# Patient Record
Sex: MALE | Race: OTHER | NOT HISPANIC OR LATINO
[De-identification: names, ages, dates, MRNs, and addresses within clinical notes are randomized per-mention and may not be internally consistent; named-entity substitution may affect disease eponyms.]

---

## 2018-08-21 ENCOUNTER — APPOINTMENT (OUTPATIENT)
Dept: CARDIOLOGY | Facility: CLINIC | Age: 33
End: 2018-08-21
Payer: COMMERCIAL

## 2018-08-21 VITALS — SYSTOLIC BLOOD PRESSURE: 112 MMHG | DIASTOLIC BLOOD PRESSURE: 84 MMHG

## 2018-08-21 DIAGNOSIS — M54.9 DORSALGIA, UNSPECIFIED: ICD-10-CM

## 2018-08-21 DIAGNOSIS — F17.200 NICOTINE DEPENDENCE, UNSPECIFIED, UNCOMPLICATED: ICD-10-CM

## 2018-08-21 DIAGNOSIS — R07.9 CHEST PAIN, UNSPECIFIED: ICD-10-CM

## 2018-08-21 DIAGNOSIS — M25.511 PAIN IN RIGHT SHOULDER: ICD-10-CM

## 2018-08-21 DIAGNOSIS — Z82.5 FAMILY HISTORY OF ASTHMA AND OTHER CHRONIC LOWER RESPIRATORY DISEASES: ICD-10-CM

## 2018-08-21 PROCEDURE — 93000 ELECTROCARDIOGRAM COMPLETE: CPT

## 2018-08-21 PROCEDURE — 99244 OFF/OP CNSLTJ NEW/EST MOD 40: CPT

## 2018-08-25 ENCOUNTER — APPOINTMENT (OUTPATIENT)
Dept: CT IMAGING | Facility: IMAGING CENTER | Age: 33
End: 2018-08-25
Payer: COMMERCIAL

## 2018-08-25 ENCOUNTER — OUTPATIENT (OUTPATIENT)
Dept: OUTPATIENT SERVICES | Facility: HOSPITAL | Age: 33
LOS: 1 days | End: 2018-08-25
Payer: COMMERCIAL

## 2018-08-25 DIAGNOSIS — Z00.8 ENCOUNTER FOR OTHER GENERAL EXAMINATION: ICD-10-CM

## 2018-08-25 PROCEDURE — 76700 US EXAM ABDOM COMPLETE: CPT

## 2018-08-25 PROCEDURE — 76700 US EXAM ABDOM COMPLETE: CPT | Mod: 26

## 2018-10-25 ENCOUNTER — APPOINTMENT (OUTPATIENT)
Dept: CARDIOLOGY | Facility: CLINIC | Age: 33
End: 2018-10-25

## 2018-11-01 ENCOUNTER — APPOINTMENT (OUTPATIENT)
Dept: CARDIOLOGY | Facility: CLINIC | Age: 33
End: 2018-11-01
Payer: COMMERCIAL

## 2018-11-01 PROCEDURE — 93015 CV STRESS TEST SUPVJ I&R: CPT

## 2018-11-15 ENCOUNTER — LABORATORY RESULT (OUTPATIENT)
Age: 33
End: 2018-11-15

## 2018-11-15 ENCOUNTER — APPOINTMENT (OUTPATIENT)
Dept: INTERNAL MEDICINE | Facility: CLINIC | Age: 33
End: 2018-11-15

## 2018-11-15 ENCOUNTER — APPOINTMENT (OUTPATIENT)
Dept: INTERNAL MEDICINE | Facility: CLINIC | Age: 33
End: 2018-11-15
Payer: COMMERCIAL

## 2018-11-15 VITALS
BODY MASS INDEX: 21.98 KG/M2 | WEIGHT: 145 LBS | HEART RATE: 101 BPM | HEIGHT: 68 IN | DIASTOLIC BLOOD PRESSURE: 85 MMHG | SYSTOLIC BLOOD PRESSURE: 122 MMHG

## 2018-11-15 DIAGNOSIS — B27.90 INFECTIOUS MONONUCLEOSIS, UNSPECIFIED W/OUT COMPLICATION: ICD-10-CM

## 2018-11-15 DIAGNOSIS — Z86.69 PERSONAL HISTORY OF OTHER DISEASES OF THE NERVOUS SYSTEM AND SENSE ORGANS: ICD-10-CM

## 2018-11-15 PROCEDURE — 99213 OFFICE O/P EST LOW 20 MIN: CPT

## 2018-11-15 NOTE — HISTORY OF PRESENT ILLNESS
[Spouse] : spouse [FreeTextEntry1] : 1 month ago treated for 1 month ago with antibiotics steroids and cough medicinet ook 3 weeks to fully better. Now began coughing again, left ear hurts sore throat Green phlegm . Today fever chills and body aches

## 2018-11-15 NOTE — PHYSICAL EXAM
[No Acute Distress] : no acute distress [Well Nourished] : well nourished [Well Developed] : well developed [Well-Appearing] : well-appearing [Normal Sclera/Conjunctiva] : normal sclera/conjunctiva [PERRL] : pupils equal round and reactive to light [EOMI] : extraocular movements intact [Normal Oropharynx] : the oropharynx was normal [No JVD] : no jugular venous distention [Supple] : supple [No Lymphadenopathy] : no lymphadenopathy [Thyroid Normal, No Nodules] : the thyroid was normal and there were no nodules present [No Respiratory Distress] : no respiratory distress  [Clear to Auscultation] : lungs were clear to auscultation bilaterally [No Accessory Muscle Use] : no accessory muscle use [Normal Rate] : normal rate  [Regular Rhythm] : with a regular rhythm [Normal S1, S2] : normal S1 and S2 [No Murmur] : no murmur heard [No Carotid Bruits] : no carotid bruits [No Abdominal Bruit] : a ~M bruit was not heard ~T in the abdomen [No Varicosities] : no varicosities [Pedal Pulses Present] : the pedal pulses are present [No Edema] : there was no peripheral edema [No Extremity Clubbing/Cyanosis] : no extremity clubbing/cyanosis [No Palpable Aorta] : no palpable aorta [Soft] : abdomen soft [Non Tender] : non-tender [Non-distended] : non-distended [No Masses] : no abdominal mass palpated [No HSM] : no HSM [Normal Bowel Sounds] : normal bowel sounds [Normal Posterior Cervical Nodes] : no posterior cervical lymphadenopathy [Normal Anterior Cervical Nodes] : no anterior cervical lymphadenopathy [No CVA Tenderness] : no CVA  tenderness [No Spinal Tenderness] : no spinal tenderness [No Joint Swelling] : no joint swelling [Grossly Normal Strength/Tone] : grossly normal strength/tone [No Rash] : no rash [Normal Gait] : normal gait [Coordination Grossly Intact] : coordination grossly intact [No Focal Deficits] : no focal deficits [Deep Tendon Reflexes (DTR)] : deep tendon reflexes were 2+ and symmetric [Normal Affect] : the affect was normal [Normal Insight/Judgement] : insight and judgment were intact [de-identified] : severe redness left TM

## 2018-11-16 LAB
ALBUMIN SERPL ELPH-MCNC: 4.8 G/DL
ALP BLD-CCNC: 76 U/L
ALT SERPL-CCNC: 41 U/L
ANION GAP SERPL CALC-SCNC: 14 MMOL/L
AST SERPL-CCNC: 22 U/L
BASOPHILS # BLD AUTO: 0.08 K/UL
BASOPHILS NFR BLD AUTO: 0.9 %
BILIRUB SERPL-MCNC: 0.8 MG/DL
BUN SERPL-MCNC: 11 MG/DL
CALCIUM SERPL-MCNC: 9.3 MG/DL
CHLORIDE SERPL-SCNC: 101 MMOL/L
CHOLEST SERPL-MCNC: 123 MG/DL
CHOLEST/HDLC SERPL: 2.9 RATIO
CO2 SERPL-SCNC: 27 MMOL/L
CREAT SERPL-MCNC: 0.79 MG/DL
EOSINOPHIL # BLD AUTO: 0.23 K/UL
EOSINOPHIL NFR BLD AUTO: 2.6 %
GLUCOSE SERPL-MCNC: 83 MG/DL
HBA1C MFR BLD HPLC: 5.7 %
HCT VFR BLD CALC: 41.2 %
HDLC SERPL-MCNC: 42 MG/DL
HGB BLD-MCNC: 13.1 G/DL
LDLC SERPL CALC-MCNC: 64 MG/DL
LYMPHOCYTES # BLD AUTO: 3.82 K/UL
LYMPHOCYTES NFR BLD AUTO: 43.5 %
MAN DIFF?: NORMAL
MCHC RBC-ENTMCNC: 22.1 PG
MCHC RBC-ENTMCNC: 31.8 GM/DL
MCV RBC AUTO: 69.5 FL
MONOCYTES # BLD AUTO: 0.69 K/UL
MONOCYTES NFR BLD AUTO: 7.8 %
NEUTROPHILS # BLD AUTO: 3.82 K/UL
NEUTROPHILS NFR BLD AUTO: 43.5 %
PLATELET # BLD AUTO: 183 K/UL
POTASSIUM SERPL-SCNC: 4.1 MMOL/L
PROT SERPL-MCNC: 7.5 G/DL
RBC # BLD: 5.93 M/UL
RBC # FLD: 15.9 %
SODIUM SERPL-SCNC: 142 MMOL/L
T4 FREE SERPL-MCNC: 1.8 NG/DL
T4 SERPL-MCNC: 9.4 UG/DL
TRIGL SERPL-MCNC: 85 MG/DL
TSH SERPL-ACNC: 0.81 UIU/ML
WBC # FLD AUTO: 8.79 K/UL

## 2018-11-17 ENCOUNTER — MOBILE ON CALL (OUTPATIENT)
Age: 33
End: 2018-11-17

## 2018-11-18 LAB — HETEROPH AB SER QL: NEGATIVE

## 2018-11-20 ENCOUNTER — APPOINTMENT (OUTPATIENT)
Dept: OTOLARYNGOLOGY | Facility: CLINIC | Age: 33
End: 2018-11-20

## 2019-01-10 ENCOUNTER — APPOINTMENT (OUTPATIENT)
Dept: INTERNAL MEDICINE | Facility: CLINIC | Age: 34
End: 2019-01-10
Payer: COMMERCIAL

## 2019-01-10 PROCEDURE — 36415 COLL VENOUS BLD VENIPUNCTURE: CPT

## 2019-01-11 LAB
HBV SURFACE AB SER QL: REACTIVE
HBV SURFACE AG SER QL: NONREACTIVE
HIV1+2 AB SPEC QL IA.RAPID: NONREACTIVE

## 2019-02-15 ENCOUNTER — RECORD ABSTRACTING (OUTPATIENT)
Age: 34
End: 2019-02-15

## 2019-02-17 ENCOUNTER — APPOINTMENT (OUTPATIENT)
Dept: INTERNAL MEDICINE | Facility: CLINIC | Age: 34
End: 2019-02-17
Payer: COMMERCIAL

## 2019-02-17 VITALS — DIASTOLIC BLOOD PRESSURE: 88 MMHG | HEART RATE: 79 BPM | SYSTOLIC BLOOD PRESSURE: 128 MMHG

## 2019-02-17 PROCEDURE — 99395 PREV VISIT EST AGE 18-39: CPT | Mod: 25

## 2019-02-17 RX ORDER — PREDNISONE 20 MG/1
20 TABLET ORAL TWICE DAILY
Refills: 0 | Status: DISCONTINUED | COMMUNITY
End: 2019-02-17

## 2019-02-17 NOTE — HISTORY OF PRESENT ILLNESS
[FreeTextEntry1] : having pains in left lateral chest and lower right chest near sternum  had bad bronchitis in november and he felt it began then

## 2019-02-17 NOTE — HEALTH RISK ASSESSMENT
[Good] : ~his/her~  mood as  good [0] : 1) Little interest or pleasure doing things: Not at all (0) [] : No [ColonoscopyDate] : never done [HIVComments] : done ok

## 2019-02-17 NOTE — PHYSICAL EXAM

## 2019-02-17 NOTE — COUNSELING
[Weight management counseling provided] : Weight management [Healthy eating counseling provided] : healthy eating [Activity counseling provided] : activity [Smoking cessation counseling provided] : smoking cessation [de-identified] : patient counseled on    diet   importance of exercise and not smoking  regular dental care and periodic eye exams.  timing of when will be due for colonoscopy   discussed\par

## 2019-02-19 LAB
MEV IGG FLD QL IA: 35.1 AU/ML
MEV IGG+IGM SER-IMP: POSITIVE
MUV AB SER-ACNC: POSITIVE
MUV IGG SER QL IA: >300 AU/ML
RUBV IGG FLD-ACNC: 2.2 INDEX
RUBV IGG SER-IMP: POSITIVE
VZV AB TITR SER: POSITIVE
VZV IGG SER IF-ACNC: 797.8 INDEX

## 2019-02-27 ENCOUNTER — APPOINTMENT (OUTPATIENT)
Dept: ENDOCRINOLOGY | Facility: CLINIC | Age: 34
End: 2019-02-27
Payer: COMMERCIAL

## 2019-02-27 VITALS — WEIGHT: 160.13 LBS | BODY MASS INDEX: 24.35 KG/M2

## 2019-02-27 PROCEDURE — 97802 MEDICAL NUTRITION INDIV IN: CPT

## 2019-03-26 DIAGNOSIS — Z01.84 ENCOUNTER FOR ANTIBODY RESPONSE EXAMINATION: ICD-10-CM

## 2019-06-02 PROBLEM — Z01.84 IMMUNITY TO MEASLES DETERMINED BY SEROLOGIC TEST: Status: ACTIVE | Noted: 2019-06-02

## 2019-06-03 LAB
MEV IGG FLD QL IA: 31.3 AU/ML
MEV IGG+IGM SER-IMP: POSITIVE

## 2019-06-04 ENCOUNTER — TRANSCRIPTION ENCOUNTER (OUTPATIENT)
Age: 34
End: 2019-06-04

## 2019-08-20 ENCOUNTER — TRANSCRIPTION ENCOUNTER (OUTPATIENT)
Age: 34
End: 2019-08-20

## 2019-09-23 ENCOUNTER — TRANSCRIPTION ENCOUNTER (OUTPATIENT)
Age: 34
End: 2019-09-23

## 2019-09-29 ENCOUNTER — APPOINTMENT (OUTPATIENT)
Dept: INTERNAL MEDICINE | Facility: CLINIC | Age: 34
End: 2019-09-29
Payer: COMMERCIAL

## 2019-09-29 VITALS — SYSTOLIC BLOOD PRESSURE: 127 MMHG | DIASTOLIC BLOOD PRESSURE: 84 MMHG | HEART RATE: 79 BPM

## 2019-09-29 DIAGNOSIS — R09.82 POSTNASAL DRIP: ICD-10-CM

## 2019-09-29 PROCEDURE — 99213 OFFICE O/P EST LOW 20 MIN: CPT

## 2019-09-29 RX ORDER — CEFDINIR 300 MG/1
300 CAPSULE ORAL
Qty: 20 | Refills: 0 | Status: DISCONTINUED | COMMUNITY
Start: 2018-11-15 | End: 2019-09-29

## 2019-09-29 NOTE — PHYSICAL EXAM
[No Acute Distress] : no acute distress [Well Developed] : well developed [Well Nourished] : well nourished [Normal Outer Ear/Nose] : the outer ears and nose were normal in appearance [Normal Sclera/Conjunctiva] : normal sclera/conjunctiva [Normal Oropharynx] : the oropharynx was normal [No JVD] : no jugular venous distention [No Lymphadenopathy] : no lymphadenopathy [No Respiratory Distress] : no respiratory distress  [Supple] : supple [No Accessory Muscle Use] : no accessory muscle use [Normal Rate] : normal rate  [Clear to Auscultation] : lungs were clear to auscultation bilaterally [Regular Rhythm] : with a regular rhythm [Normal S1, S2] : normal S1 and S2

## 2019-09-29 NOTE — HISTORY OF PRESENT ILLNESS
[FreeTextEntry1] : pleuritic left chest pain  with cough at times and wheezing at night with some mucous  being coughed up  he is coughing a lot at night at   times he hears a wheeze claritin  has not done much the pain is so severe at times  at night he has has toget up  he has been feeling this way since 2 weeks ago

## 2019-10-18 ENCOUNTER — APPOINTMENT (OUTPATIENT)
Dept: PULMONOLOGY | Facility: CLINIC | Age: 34
End: 2019-10-18

## 2019-10-28 ENCOUNTER — APPOINTMENT (OUTPATIENT)
Dept: PULMONOLOGY | Facility: CLINIC | Age: 34
End: 2019-10-28
Payer: COMMERCIAL

## 2019-10-28 DIAGNOSIS — R05 COUGH: ICD-10-CM

## 2019-10-28 DIAGNOSIS — R07.81 PLEURODYNIA: ICD-10-CM

## 2019-10-28 PROCEDURE — 99203 OFFICE O/P NEW LOW 30 MIN: CPT | Mod: 25

## 2019-10-28 PROCEDURE — 94729 DIFFUSING CAPACITY: CPT

## 2019-10-28 PROCEDURE — 94727 GAS DIL/WSHOT DETER LNG VOL: CPT

## 2019-10-28 PROCEDURE — 94060 EVALUATION OF WHEEZING: CPT

## 2019-10-29 PROBLEM — R07.81 ANTERIOR PLEURITIC PAIN: Status: ACTIVE | Noted: 2019-09-29

## 2019-10-29 PROBLEM — R05 COUGH IN ADULT: Status: ACTIVE | Noted: 2018-11-15

## 2019-10-29 NOTE — HISTORY OF PRESENT ILLNESS
[FreeTextEntry1] : 33 year old man who is presenting due to left sided and lower sternal chest pain with breathing, ongoing cough with nasal congestion.  \par \par Symptoms developed about 2 months ago when he developed a URI and bronchitis.  He had dyspnea, cough, nasal congestion, brown mucus.  This improved but he still has episodic cough.  He still has spasmodic cough and some nasal congestion.   He notes tickle in throat.\par No wheeze.\par \par He has also had similar symptoms in past with change of season.\par \par He developed chest pain along with this that is worse with deep breath and lying on left side. Pain is worse at night.  It is located substernal and towards the left. \par \par SH\par \par former smoker since age 17 who stopped 3 years ago , 1 pack per month\par \par ETOH:  occasional\par \par \par ROS:\par no significant GERD\par \par \par PSH\par \par right shoulder injury\par \par PMH\par \par occasional back pain\par \par \par No history of asthma\par \par \par ALLERGIES\par \par nut, other foods\par \par some seasonal allergy, mild\par \par dust roaches, dust mite\par \par he has seen allergist\par MEDS:\par \par naproxen as needed\par \par Family History:\par \par There is no family history of cancer, chronic lung disease.

## 2019-10-29 NOTE — DISCUSSION/SUMMARY
[FreeTextEntry1] : Cough\par \par chest pain:  likely due to cough, possible costochondritis or intercostal muscle strain\par \par no obstruction on PFT\par \par PLAN\par \par CXR\par \par left rib series\par \par ibuprofen 2 tablets 1-2 times per day as needed, warm compress, cough suppressant prescribe Hycodan syrup 1-2 teaspoons at night\par \par saline nasal spray\par \par mucinex DM  at night\par \par [facility]

## 2019-10-29 NOTE — PHYSICAL EXAM
[Well Groomed] : well groomed [No Deformities] : no deformities [General Appearance - In No Acute Distress] : no acute distress [Normal Oropharynx] : normal oropharynx [Neck Appearance] : the appearance of the neck was normal [Jugular Venous Distention Increased] : there was no jugular-venous distention [Heart Rate And Rhythm] : heart rate and rhythm were normal [Heart Sounds] : normal S1 and S2 [Murmurs] : no murmurs present [Arterial Pulses Normal] : the arterial pulses were normal [Respiration, Rhythm And Depth] : normal respiratory rhythm and effort [Exaggerated Use Of Accessory Muscles For Inspiration] : no accessory muscle use [Auscultation Breath Sounds / Voice Sounds] : lungs were clear to auscultation bilaterally [Bowel Sounds] : normal bowel sounds [Abdomen Soft] : soft [Abdomen Tenderness] : non-tender [] : no hepato-splenomegaly [Abdomen Mass (___ Cm)] : no abdominal mass palpated [Abnormal Walk] : normal gait [Motor Exam] : the motor exam was normal [Affect] : the affect was normal [Mood] : the mood was normal [Low Lying Soft Palate] : no low lying soft palate [Elongated Uvula] : no elongated uvula [Enlarged Base of the Tongue] : no enlargement of the base of the tongue [Erythema] : no erythema of the pharynx [FreeTextEntry2] : no edema

## 2019-10-29 NOTE — REVIEW OF SYSTEMS
[Nasal Congestion] : nasal congestion [Cough] : cough [Dyspnea] : dyspnea [Pleuritic Pain] : pleuritic pain [Ear Disturbance] : no ear disturbance [Sinus Problems] : no sinus problems [Hypertension] : no ~T hypertension [Heartburn] : no heartburn [Reflux] : no reflux

## 2019-11-01 ENCOUNTER — FORM ENCOUNTER (OUTPATIENT)
Age: 34
End: 2019-11-01

## 2019-11-02 ENCOUNTER — APPOINTMENT (OUTPATIENT)
Dept: RADIOLOGY | Facility: IMAGING CENTER | Age: 34
End: 2019-11-02
Payer: COMMERCIAL

## 2019-11-02 ENCOUNTER — OUTPATIENT (OUTPATIENT)
Dept: OUTPATIENT SERVICES | Facility: HOSPITAL | Age: 34
LOS: 1 days | End: 2019-11-02
Payer: COMMERCIAL

## 2019-11-02 DIAGNOSIS — J45.909 UNSPECIFIED ASTHMA, UNCOMPLICATED: ICD-10-CM

## 2019-11-02 DIAGNOSIS — R05 COUGH: ICD-10-CM

## 2019-11-02 PROCEDURE — 71046 X-RAY EXAM CHEST 2 VIEWS: CPT

## 2019-11-02 PROCEDURE — 71110 X-RAY EXAM RIBS BIL 3 VIEWS: CPT | Mod: 26

## 2019-11-02 PROCEDURE — 71110 X-RAY EXAM RIBS BIL 3 VIEWS: CPT

## 2019-11-02 PROCEDURE — 71046 X-RAY EXAM CHEST 2 VIEWS: CPT | Mod: 26

## 2019-11-15 ENCOUNTER — APPOINTMENT (OUTPATIENT)
Dept: PULMONOLOGY | Facility: CLINIC | Age: 34
End: 2019-11-15

## 2019-12-06 ENCOUNTER — RX RENEWAL (OUTPATIENT)
Age: 34
End: 2019-12-06

## 2019-12-30 ENCOUNTER — TRANSCRIPTION ENCOUNTER (OUTPATIENT)
Age: 34
End: 2019-12-30

## 2020-02-17 ENCOUNTER — APPOINTMENT (OUTPATIENT)
Dept: ENDOCRINOLOGY | Facility: CLINIC | Age: 35
End: 2020-02-17
Payer: COMMERCIAL

## 2020-02-17 VITALS
SYSTOLIC BLOOD PRESSURE: 139 MMHG | TEMPERATURE: 98.5 F | RESPIRATION RATE: 16 BRPM | WEIGHT: 159 LBS | HEIGHT: 68 IN | HEART RATE: 84 BPM | DIASTOLIC BLOOD PRESSURE: 83 MMHG | OXYGEN SATURATION: 97 % | BODY MASS INDEX: 24.1 KG/M2

## 2020-02-17 LAB
GLUCOSE BLDC GLUCOMTR-MCNC: 94
HBA1C MFR BLD HPLC: 5.6

## 2020-02-17 PROCEDURE — 82962 GLUCOSE BLOOD TEST: CPT

## 2020-02-17 PROCEDURE — 83036 HEMOGLOBIN GLYCOSYLATED A1C: CPT | Mod: QW

## 2020-02-17 PROCEDURE — 99203 OFFICE O/P NEW LOW 30 MIN: CPT | Mod: 25

## 2020-02-17 NOTE — REVIEW OF SYSTEMS
[Fatigue] : no fatigue [Decreased Appetite] : appetite not decreased [Visual Field Defect] : no visual field defect [Blurry Vision] : no blurred vision [Dysphonia] : no dysphonia [Dysphagia] : no dysphagia [Chest Pain] : no chest pain [Palpitations] : no palpitations [Wheezing] : no wheezing was heard [Shortness Of Breath] : no shortness of breath [Cough] : no cough [Nausea] : no nausea [Constipation] : no constipation [Vomiting] : no vomiting was observed [Dysuria] : no dysuria [Diarrhea] : no diarrhea [Incontinence] : no incontinence [Joint Pain] : no joint pain [Joint Stiffness] : no joint stiffness [Muscle Weakness] : no muscle weakness [Hair Loss] : no hair loss [Muscle Cramps] : no muscle cramps [Dry Skin] : no dry skin [Headache] : no headaches [Tremors] : no tremors [Dizziness] : no dizziness [Cold Intolerance] : cold tolerant [Anxiety] : no anxiety [Depression] : no depression [Heat Intolerance] : heat tolerant [Swelling] : no swelling [Lymphadenopathy] : no lymphadenopathy

## 2020-02-17 NOTE — PHYSICAL EXAM
[Alert] : alert [No Acute Distress] : no acute distress [Normal Sclera/Conjunctiva] : normal sclera/conjunctiva [Well Nourished] : well nourished [Well Developed] : well developed [EOMI] : extra ocular movement intact [Normal Oropharynx] : the oropharynx was normal [No Proptosis] : no proptosis [Thyroid Not Enlarged] : the thyroid was not enlarged [No Thyroid Nodules] : there were no palpable thyroid nodules [No Respiratory Distress] : no respiratory distress [Clear to Auscultation] : lungs were clear to auscultation bilaterally [Normal Rate] : heart rate was normal  [No Accessory Muscle Use] : no accessory muscle use [Normal S1, S2] : normal S1 and S2 [Regular Rhythm] : with a regular rhythm [Pedal Pulses Normal] : the pedal pulses are present [Normal Bowel Sounds] : normal bowel sounds [No Edema] : there was no peripheral edema [Not Distended] : not distended [Soft] : abdomen soft [Not Tender] : non-tender [No Stigmata of Cushings Syndrome] : no stigmata of cushings syndrome [Spine Straight] : spine straight [No Spinal Tenderness] : no spinal tenderness [Normal Gait] : normal gait [Normal Strength/Tone] : muscle strength and tone were normal [Normal Reflexes] : deep tendon reflexes were 2+ and symmetric [No Rash] : no rash [Oriented x3] : oriented to person, place, and time [No Tremors] : no tremors [Acanthosis Nigricans] : no acanthosis nigricans

## 2020-02-17 NOTE — HISTORY OF PRESENT ILLNESS
[FreeTextEntry1] : 34 year old male with PMH of pre-DM (dx 2018) presented to establish care for pre-DM.\par \par Recent A1c 5.8% in 11/2019. \par POC A1c in office today : 5.6%\par \par Patient reports feeling well overall. Patient denies polyuria, polydipsia, shortness of breath, or lower extremity edema.He reports night sweats over the last several months. He reports profuse sweating overnight that soaks the sheets. \par \par Current DM regimen:\par no medications\par \par SMBG: not checking\par FS in office: fasting 94 \par \par Hypoglycemia none\par Has hypoglycemic awareness\par \par Diet: cut back on sweets, changed to brown rice, trying to keep keto diet and intermittent fasting, lean meats \par \par Weight loss: Has lost 5-6 lbs since 11/2019. \par \par Exercise: house work, walks daily \par \par Family history: maternal and paternal grandparents- DM

## 2020-02-17 NOTE — ASSESSMENT
[FreeTextEntry1] : 1. Pre-DM\par -A1c now within normal range to 5.6% following change in diet and exercise\par -targets for weight and A1c  have been discussed with pt \par -Exercise and healthy eating has been discussed with the pt and its importance in the management of pre- diabetes\par \par 2. Night sweats\par -patient to have TB and HIV ruled out by PMD \par -will obtain TFTs for further evaluation. Pheochromocytoma unlikely given symptoms are only overnight and BP well controlled

## 2020-02-19 LAB
ALBUMIN SERPL ELPH-MCNC: 4.8 G/DL
ALP BLD-CCNC: 66 U/L
ALT SERPL-CCNC: 75 U/L
ANION GAP SERPL CALC-SCNC: 12 MMOL/L
AST SERPL-CCNC: 36 U/L
BILIRUB SERPL-MCNC: 0.7 MG/DL
BUN SERPL-MCNC: 10 MG/DL
CALCIUM SERPL-MCNC: 9.7 MG/DL
CHLORIDE SERPL-SCNC: 102 MMOL/L
CO2 SERPL-SCNC: 27 MMOL/L
CREAT SERPL-MCNC: 1.09 MG/DL
GLUCOSE SERPL-MCNC: 102 MG/DL
POTASSIUM SERPL-SCNC: 4 MMOL/L
PROT SERPL-MCNC: 7.2 G/DL
SODIUM SERPL-SCNC: 141 MMOL/L
T4 FREE SERPL-MCNC: 1.4 NG/DL
TSH SERPL-ACNC: 1.73 UIU/ML

## 2020-03-09 ENCOUNTER — TRANSCRIPTION ENCOUNTER (OUTPATIENT)
Age: 35
End: 2020-03-09

## 2020-07-29 DIAGNOSIS — Z11.59 ENCOUNTER FOR SCREENING FOR OTHER VIRAL DISEASES: ICD-10-CM

## 2020-08-20 ENCOUNTER — APPOINTMENT (OUTPATIENT)
Dept: NUCLEAR MEDICINE | Facility: IMAGING CENTER | Age: 35
End: 2020-08-20
Payer: COMMERCIAL

## 2020-08-20 ENCOUNTER — OUTPATIENT (OUTPATIENT)
Dept: OUTPATIENT SERVICES | Facility: HOSPITAL | Age: 35
LOS: 1 days | End: 2020-08-20
Payer: COMMERCIAL

## 2020-08-20 DIAGNOSIS — Z00.8 ENCOUNTER FOR OTHER GENERAL EXAMINATION: ICD-10-CM

## 2020-08-20 PROCEDURE — 78315 BONE IMAGING 3 PHASE: CPT | Mod: 26

## 2020-08-20 PROCEDURE — 78830 RP LOCLZJ TUM SPECT W/CT 1: CPT

## 2020-08-20 PROCEDURE — 78315 BONE IMAGING 3 PHASE: CPT

## 2020-08-20 PROCEDURE — A9561: CPT

## 2020-08-20 PROCEDURE — 78830 RP LOCLZJ TUM SPECT W/CT 1: CPT | Mod: 26

## 2020-09-13 ENCOUNTER — NON-APPOINTMENT (OUTPATIENT)
Age: 35
End: 2020-09-13

## 2020-09-13 ENCOUNTER — APPOINTMENT (OUTPATIENT)
Dept: INTERNAL MEDICINE | Facility: CLINIC | Age: 35
End: 2020-09-13
Payer: COMMERCIAL

## 2020-09-13 VITALS — WEIGHT: 167 LBS | RESPIRATION RATE: 11 BRPM | BODY MASS INDEX: 25.31 KG/M2 | HEIGHT: 68 IN

## 2020-09-13 VITALS — SYSTOLIC BLOOD PRESSURE: 110 MMHG | DIASTOLIC BLOOD PRESSURE: 84 MMHG

## 2020-09-13 DIAGNOSIS — N50.819 TESTICULAR PAIN, UNSPECIFIED: ICD-10-CM

## 2020-09-13 DIAGNOSIS — Z23 ENCOUNTER FOR IMMUNIZATION: ICD-10-CM

## 2020-09-13 DIAGNOSIS — R10.9 UNSPECIFIED ABDOMINAL PAIN: ICD-10-CM

## 2020-09-13 LAB
BILIRUB UR QL STRIP: NEGATIVE
GLUCOSE UR-MCNC: NORMAL
HCG UR QL: 0.2 EU/DL
HGB UR QL STRIP.AUTO: NORMAL
KETONES UR-MCNC: NEGATIVE
LEUKOCYTE ESTERASE UR QL STRIP: NEGATIVE
NITRITE UR QL STRIP: NEGATIVE
PH UR STRIP: 5
PROT UR STRIP-MCNC: NEGATIVE
SP GR UR STRIP: >=1.03

## 2020-09-13 PROCEDURE — 90686 IIV4 VACC NO PRSV 0.5 ML IM: CPT

## 2020-09-13 PROCEDURE — G0444 DEPRESSION SCREEN ANNUAL: CPT | Mod: NC

## 2020-09-13 PROCEDURE — 81003 URINALYSIS AUTO W/O SCOPE: CPT | Mod: QW

## 2020-09-13 PROCEDURE — 99395 PREV VISIT EST AGE 18-39: CPT | Mod: 25

## 2020-09-13 PROCEDURE — 93000 ELECTROCARDIOGRAM COMPLETE: CPT

## 2020-09-13 PROCEDURE — 36415 COLL VENOUS BLD VENIPUNCTURE: CPT

## 2020-09-13 PROCEDURE — G0008: CPT

## 2020-09-13 RX ORDER — NAPROXEN SODIUM 220 MG
220 TABLET ORAL
Qty: 40 | Refills: 1 | Status: DISCONTINUED | COMMUNITY
Start: 2020-03-25 | End: 2020-09-13

## 2020-09-13 RX ORDER — HYDROCODONE BITARTRATE AND HOMATROPINE METHYLBROMIDE 5; 1.5 MG/5ML; MG/5ML
5-1.5 SYRUP ORAL
Qty: 240 | Refills: 0 | Status: DISCONTINUED | COMMUNITY
Start: 2018-11-15 | End: 2020-09-13

## 2020-09-13 RX ORDER — HYDROCODONE BITARTRATE AND HOMATROPINE METHYLBROMIDE 5; 1.5 MG/5ML; MG/5ML
5-1.5 SYRUP ORAL
Qty: 1 | Refills: 0 | Status: DISCONTINUED | COMMUNITY
Start: 2019-10-28 | End: 2020-09-13

## 2020-09-13 RX ORDER — AMOXICILLIN AND CLAVULANATE POTASSIUM 875; 125 MG/1; MG/1
875-125 TABLET, COATED ORAL
Qty: 20 | Refills: 0 | Status: DISCONTINUED | COMMUNITY
Start: 2019-09-29 | End: 2020-09-13

## 2020-09-13 NOTE — HISTORY OF PRESENT ILLNESS
[FreeTextEntry1] : has  a lot of gas  nad  ruq abd pain  at times worse after he eats   also says he  very tired he snores   stilll with night sweats

## 2020-09-13 NOTE — REVIEW OF SYSTEMS
[Negative] : Heme/Lymph [Night Sweats] : night sweats [FreeTextEntry8] : feels pain in testicles  when urinating [FreeTextEntry7] : gas   some heartburn

## 2020-09-13 NOTE — HEALTH RISK ASSESSMENT
[Good] : ~his/her~  mood as  good [] : Yes [Yes] : Yes [No Retinopathy] : No retinopathy [None] : None [Fully functional (bathing, dressing, toileting, transferring, walking, feeding)] : Fully functional (bathing, dressing, toileting, transferring, walking, feeding) [Fully functional (using the telephone, shopping, preparing meals, housekeeping, doing laundry, using] : Fully functional and needs no help or supervision to perform IADLs (using the telephone, shopping, preparing meals, housekeeping, doing laundry, using transportation, managing medications and managing finances) [0] : 1) Little interest or pleasure doing things: Not at all (0) [de-identified] : no exercise [de-identified] : poor [Change in mental status noted] : No change in mental status noted [Reports changes in hearing] : Reports no changes in hearing [Reports changes in vision] : Reports no changes in vision

## 2020-09-13 NOTE — PHYSICAL EXAM
[Normal] : normal gait, coordination grossly intact, no focal deficits [Normal Male:] : meatus normal, the bladder was normal on palpation, the scrotum was normal, there were no testicular masses and no prostate nodules.

## 2020-09-15 LAB
ALBUMIN SERPL ELPH-MCNC: 5.1 G/DL
ALP BLD-CCNC: 88 U/L
ALT SERPL-CCNC: 171 U/L
ANION GAP SERPL CALC-SCNC: 17 MMOL/L
AST SERPL-CCNC: 69 U/L
BASOPHILS # BLD AUTO: 0.07 K/UL
BASOPHILS NFR BLD AUTO: 0.9 %
BILIRUB SERPL-MCNC: 0.7 MG/DL
BUN SERPL-MCNC: 10 MG/DL
CALCIUM SERPL-MCNC: 10 MG/DL
CHLORIDE SERPL-SCNC: 101 MMOL/L
CHOLEST SERPL-MCNC: 146 MG/DL
CHOLEST/HDLC SERPL: 3.2 RATIO
CO2 SERPL-SCNC: 23 MMOL/L
CREAT SERPL-MCNC: 1.02 MG/DL
EOSINOPHIL # BLD AUTO: 0.19 K/UL
EOSINOPHIL NFR BLD AUTO: 2.4 %
ESTIMATED AVERAGE GLUCOSE: 123 MG/DL
GLUCOSE SERPL-MCNC: 87 MG/DL
HBA1C MFR BLD HPLC: 5.9 %
HCT VFR BLD CALC: 46.7 %
HDLC SERPL-MCNC: 46 MG/DL
HGB BLD-MCNC: 14 G/DL
IMM GRANULOCYTES NFR BLD AUTO: 0.3 %
LDLC SERPL CALC-MCNC: 79 MG/DL
LYMPHOCYTES # BLD AUTO: 3.35 K/UL
LYMPHOCYTES NFR BLD AUTO: 43.1 %
MAN DIFF?: NORMAL
MCHC RBC-ENTMCNC: 22 PG
MCHC RBC-ENTMCNC: 30 GM/DL
MCV RBC AUTO: 73.3 FL
MONOCYTES # BLD AUTO: 0.51 K/UL
MONOCYTES NFR BLD AUTO: 6.6 %
NEUTROPHILS # BLD AUTO: 3.64 K/UL
NEUTROPHILS NFR BLD AUTO: 46.7 %
PLATELET # BLD AUTO: 177 K/UL
POTASSIUM SERPL-SCNC: 4.4 MMOL/L
PROT SERPL-MCNC: 7.8 G/DL
RBC # BLD: 6.37 M/UL
RBC # FLD: 18.6 %
SARS-COV-2 IGG SERPL IA-ACNC: 0.08 INDEX
SARS-COV-2 IGG SERPL QL IA: NEGATIVE
SODIUM SERPL-SCNC: 141 MMOL/L
TRIGL SERPL-MCNC: 106 MG/DL
WBC # FLD AUTO: 7.78 K/UL

## 2020-09-24 ENCOUNTER — APPOINTMENT (OUTPATIENT)
Dept: ULTRASOUND IMAGING | Facility: CLINIC | Age: 35
End: 2020-09-24

## 2020-09-24 ENCOUNTER — APPOINTMENT (OUTPATIENT)
Dept: RADIOLOGY | Facility: CLINIC | Age: 35
End: 2020-09-24

## 2020-09-26 ENCOUNTER — APPOINTMENT (OUTPATIENT)
Dept: PULMONOLOGY | Facility: CLINIC | Age: 35
End: 2020-09-26

## 2020-10-10 ENCOUNTER — APPOINTMENT (OUTPATIENT)
Dept: PULMONOLOGY | Facility: CLINIC | Age: 35
End: 2020-10-10

## 2020-10-22 LAB
ALBUMIN SERPL ELPH-MCNC: 4.8 G/DL
ALP BLD-CCNC: 90 U/L
ALT SERPL-CCNC: 154 U/L
ANION GAP SERPL CALC-SCNC: 13 MMOL/L
AST SERPL-CCNC: 56 U/L
BILIRUB SERPL-MCNC: 0.6 MG/DL
BUN SERPL-MCNC: 14 MG/DL
CALCIUM SERPL-MCNC: 9.9 MG/DL
CHLORIDE SERPL-SCNC: 101 MMOL/L
CO2 SERPL-SCNC: 26 MMOL/L
CREAT SERPL-MCNC: 1.04 MG/DL
GGT SERPL-CCNC: 116 U/L
GLUCOSE SERPL-MCNC: 90 MG/DL
POTASSIUM SERPL-SCNC: 4.4 MMOL/L
PROT SERPL-MCNC: 7.4 G/DL
SODIUM SERPL-SCNC: 140 MMOL/L

## 2020-10-23 LAB — SARS-COV-2 N GENE NPH QL NAA+PROBE: NOT DETECTED

## 2020-11-17 ENCOUNTER — APPOINTMENT (OUTPATIENT)
Dept: UROLOGY | Facility: CLINIC | Age: 35
End: 2020-11-17
Payer: COMMERCIAL

## 2020-11-17 VITALS
DIASTOLIC BLOOD PRESSURE: 94 MMHG | HEIGHT: 68 IN | TEMPERATURE: 98.3 F | SYSTOLIC BLOOD PRESSURE: 144 MMHG | BODY MASS INDEX: 23.64 KG/M2 | HEART RATE: 74 BPM | RESPIRATION RATE: 16 BRPM | WEIGHT: 156 LBS

## 2020-11-17 PROCEDURE — 99203 OFFICE O/P NEW LOW 30 MIN: CPT

## 2020-11-19 NOTE — PHYSICAL EXAM
[General Appearance - Well Developed] : well developed [General Appearance - Well Nourished] : well nourished [Edema] : no peripheral edema [Exaggerated Use Of Accessory Muscles For Inspiration] : no accessory muscle use [Abdomen Soft] : soft [Abdomen Tenderness] : non-tender [Abdomen Mass (___ Cm)] : no abdominal mass palpated [Abdomen Hernia] : no hernia was discovered [Deferred] : exam was deferred [Prostate Tenderness] : was not tender [No Lesions] : no lesions [Circumcised] : the penis was circumcised [Normal] : normal [Scrotum Hydrocele On The Right] : no hydrocele [Scrotum Varicocele Right] : no varicocele [Scrotum Hydrocele On The Left] : no hydrocele [Scrotum Varicocele Left] : a varicocele [Testes] : normal [Epididymis] : was normal [Epididymis Spermatocele (___ Cm)] : had a spermatocele [Vas Deferens / Spermatic Cord] : was normal [Normal Station and Gait] : the gait and station were normal for the patient's age [] : no rash [No Focal Deficits] : no focal deficits [Oriented To Time, Place, And Person] : oriented to person, place, and time [Inguinal Lymph Nodes Enlarged Bilaterally] : inguinal

## 2020-11-19 NOTE — ASSESSMENT
[FreeTextEntry1] : On exam note left Vx and a cyst in epididymis - sent for ULS\par given infertility will refer to [facility] for W/U

## 2020-11-19 NOTE — HISTORY OF PRESENT ILLNESS
[FreeTextEntry1] : Patient here for evaluation of scrotal pain.\par he ahs noted over past 6 months intermittent left sided scrotal  pain - more of an ache localizing above and behind his testis. On exam he notes some larger veins. Sitting is better and more issue when standing.No swelling or tenderness. No h/o trauma, STDs or scrotall/pelvic surgery. \par \par during questioning also notes infertility. He . he and his wife had a stillborn but have been having unprotected sex f0r over a  1 year with no pregancies.

## 2020-12-05 ENCOUNTER — OUTPATIENT (OUTPATIENT)
Dept: OUTPATIENT SERVICES | Facility: HOSPITAL | Age: 35
LOS: 1 days | End: 2020-12-05
Payer: COMMERCIAL

## 2020-12-05 ENCOUNTER — APPOINTMENT (OUTPATIENT)
Dept: ULTRASOUND IMAGING | Facility: CLINIC | Age: 35
End: 2020-12-05
Payer: COMMERCIAL

## 2020-12-05 DIAGNOSIS — N50.82 SCROTAL PAIN: ICD-10-CM

## 2020-12-05 DIAGNOSIS — Z00.8 ENCOUNTER FOR OTHER GENERAL EXAMINATION: ICD-10-CM

## 2020-12-05 DIAGNOSIS — N46.9 MALE INFERTILITY, UNSPECIFIED: ICD-10-CM

## 2020-12-05 PROCEDURE — 93975 VASCULAR STUDY: CPT | Mod: 26

## 2020-12-05 PROCEDURE — 93975 VASCULAR STUDY: CPT

## 2020-12-16 PROBLEM — Z86.69 HISTORY OF OTITIS MEDIA: Status: RESOLVED | Noted: 2018-11-15 | Resolved: 2020-12-16

## 2021-01-21 ENCOUNTER — APPOINTMENT (OUTPATIENT)
Dept: UROLOGY | Facility: CLINIC | Age: 36
End: 2021-01-21
Payer: COMMERCIAL

## 2021-01-21 VITALS — TEMPERATURE: 98 F

## 2021-01-21 DIAGNOSIS — N46.9 MALE INFERTILITY, UNSPECIFIED: ICD-10-CM

## 2021-01-21 DIAGNOSIS — N43.40 SPERMATOCELE OF EPIDIDYMIS, UNSPECIFIED: ICD-10-CM

## 2021-01-21 DIAGNOSIS — N50.82 SCROTAL PAIN: ICD-10-CM

## 2021-01-21 PROCEDURE — 99072 ADDL SUPL MATRL&STAF TM PHE: CPT

## 2021-01-21 PROCEDURE — 99213 OFFICE O/P EST LOW 20 MIN: CPT

## 2021-01-21 NOTE — PHYSICAL EXAM
[Penis Abnormality] : normal circumcised penis [Testes Tenderness] : no tenderness of the testes [Skin Color & Pigmentation] : normal skin color and pigmentation [FreeTextEntry1] : left spermatocele; vas x 2 ; tight scrotum

## 2021-01-21 NOTE — ASSESSMENT
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 related to maternal diabetes.  They have been attemptnig to achieve a pregancy for  2 years\par He was told of a varicocele; however ultrasound demonstrates a spermatocele and describes a "tubular"  IMAGES REVIEWED.  The examination was not done upright nor did he peform a varicocele.\par He has a tight scrotum making examination difficult. \par His wife joined consultation after comleted by video.\par We discussed ultrasound and examonation\par We discussed :\par 1. endocrine evaluation\par 2. semen analysis\par 3. probable repeat USG and time of next visit.\par The R[facility] DANIELAROBBIE  expressed fully understanding of the information provided, the consequences and the management.\par

## 2021-01-21 NOTE — HISTORY OF PRESENT ILLNESS
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 related to maternal diabetes\par Duration of Relationship: 15 years; unprotected sexual intercourse x 2 years\par Years unprotected sexual intercourse:\par Time Bright:\par Sexual dysfunction: none\par Artificial lubricants: none\par Exposure history: occasional cigarette and etoh\par  [Erectile Dysfunction] : no Erectile Dysfunction

## 2021-02-24 ENCOUNTER — TRANSCRIPTION ENCOUNTER (OUTPATIENT)
Age: 36
End: 2021-02-24

## 2021-06-03 LAB — SARS-COV-2 N GENE NPH QL NAA+PROBE: NOT DETECTED

## 2021-06-11 ENCOUNTER — APPOINTMENT (OUTPATIENT)
Dept: INTERNAL MEDICINE | Facility: CLINIC | Age: 36
End: 2021-06-11
Payer: COMMERCIAL

## 2021-06-11 VITALS
TEMPERATURE: 98.9 F | HEART RATE: 82 BPM | WEIGHT: 174 LBS | SYSTOLIC BLOOD PRESSURE: 132 MMHG | OXYGEN SATURATION: 97 % | DIASTOLIC BLOOD PRESSURE: 84 MMHG | BODY MASS INDEX: 26.37 KG/M2 | HEIGHT: 68 IN

## 2021-06-11 DIAGNOSIS — J01.90 ACUTE SINUSITIS, UNSPECIFIED: ICD-10-CM

## 2021-06-11 PROCEDURE — 99072 ADDL SUPL MATRL&STAF TM PHE: CPT

## 2021-06-11 PROCEDURE — 99213 OFFICE O/P EST LOW 20 MIN: CPT

## 2021-06-11 NOTE — HISTORY OF PRESENT ILLNESS
[FreeTextEntry1] : he  has nasal congestion and chest tightness with chest pain when he coughs  thought he was a little hot 2 days ago  he is  decreasing  carbs last a1c 5.9

## 2021-06-11 NOTE — PHYSICAL EXAM
[Normal Outer Ear/Nose] : the outer ears and nose were normal in appearance [Normal Oropharynx] : the oropharynx was normal [Normal TMs] : both tympanic membranes were normal [No JVD] : no jugular venous distention [Supple] : supple [No Respiratory Distress] : no respiratory distress  [No Accessory Muscle Use] : no accessory muscle use [Clear to Auscultation] : lungs were clear to auscultation bilaterally

## 2021-06-14 ENCOUNTER — TRANSCRIPTION ENCOUNTER (OUTPATIENT)
Age: 36
End: 2021-06-14

## 2021-06-15 ENCOUNTER — TRANSCRIPTION ENCOUNTER (OUTPATIENT)
Age: 36
End: 2021-06-15

## 2021-07-25 ENCOUNTER — APPOINTMENT (OUTPATIENT)
Dept: INTERNAL MEDICINE | Facility: CLINIC | Age: 36
End: 2021-07-25
Payer: COMMERCIAL

## 2021-07-25 VITALS — DIASTOLIC BLOOD PRESSURE: 73 MMHG | HEART RATE: 75 BPM | SYSTOLIC BLOOD PRESSURE: 137 MMHG

## 2021-07-25 VITALS — BODY MASS INDEX: 26 KG/M2 | WEIGHT: 171 LBS

## 2021-07-25 VITALS — SYSTOLIC BLOOD PRESSURE: 110 MMHG | DIASTOLIC BLOOD PRESSURE: 70 MMHG

## 2021-07-25 DIAGNOSIS — N64.4 MASTODYNIA: ICD-10-CM

## 2021-07-25 DIAGNOSIS — R53.83 OTHER FATIGUE: ICD-10-CM

## 2021-07-25 DIAGNOSIS — R73.9 HYPERGLYCEMIA, UNSPECIFIED: ICD-10-CM

## 2021-07-25 DIAGNOSIS — R61 GENERALIZED HYPERHIDROSIS: ICD-10-CM

## 2021-07-25 PROCEDURE — 99214 OFFICE O/P EST MOD 30 MIN: CPT | Mod: 25

## 2021-07-25 PROCEDURE — 36415 COLL VENOUS BLD VENIPUNCTURE: CPT

## 2021-07-25 PROCEDURE — 99072 ADDL SUPL MATRL&STAF TM PHE: CPT

## 2021-07-25 RX ORDER — BENZONATATE 200 MG/1
200 CAPSULE ORAL EVERY 8 HOURS
Qty: 60 | Refills: 0 | Status: DISCONTINUED | COMMUNITY
Start: 2021-06-14 | End: 2021-07-25

## 2021-07-25 RX ORDER — BROMPHENIRAMINE MALEATE, PSEUDOEPHEDRINE HYDROCHLORIDE, 2; 30; 10 MG/5ML; MG/5ML; MG/5ML
30-2-10 SYRUP ORAL
Qty: 250 | Refills: 0 | Status: DISCONTINUED | COMMUNITY
Start: 2019-11-21 | End: 2021-07-25

## 2021-07-25 RX ORDER — NAPROXEN SODIUM 220 MG
220 TABLET ORAL
Qty: 40 | Refills: 0 | Status: DISCONTINUED | COMMUNITY
Start: 2019-09-29 | End: 2021-07-25

## 2021-07-25 RX ORDER — IPRATROPIUM BROMIDE 42 UG/1
0.06 SPRAY NASAL 3 TIMES DAILY
Qty: 45 | Refills: 0 | Status: DISCONTINUED | COMMUNITY
Start: 2019-09-29 | End: 2021-07-25

## 2021-07-25 RX ORDER — AMOXICILLIN AND CLAVULANATE POTASSIUM 875; 125 MG/1; MG/1
875-125 TABLET, COATED ORAL
Qty: 20 | Refills: 0 | Status: DISCONTINUED | COMMUNITY
Start: 2020-03-06 | End: 2021-07-25

## 2021-07-25 RX ORDER — BROMPHENIRAMINE MALEATE, PSEUDOEPHEDRINE HYDROCHLORIDE, 2; 30; 10 MG/5ML; MG/5ML; MG/5ML
30-2-10 SYRUP ORAL
Qty: 250 | Refills: 0 | Status: DISCONTINUED | COMMUNITY
Start: 2020-10-19 | End: 2021-07-25

## 2021-07-25 NOTE — HISTORY OF PRESENT ILLNESS
[FreeTextEntry1] : f/u  for predm  also says he is very tired  despite sleeping a lot  he does not do  exercise.  he never did the ordered lfts.  he still has  night sweats 3 times per week m also says he feels a lump in    in left breast   and feels pain in right breast but does not feel anything  he says  he is not   watching his carbs

## 2021-07-25 NOTE — PHYSICAL EXAM
[No Acute Distress] : no acute distress [Well Nourished] : well nourished [Well Developed] : well developed [Well-Appearing] : well-appearing [Normal Sclera/Conjunctiva] : normal sclera/conjunctiva [EOMI] : extraocular movements intact [Normal Outer Ear/Nose] : the outer ears and nose were normal in appearance [No JVD] : no jugular venous distention [No Lymphadenopathy] : no lymphadenopathy [Supple] : supple [Thyroid Normal, No Nodules] : the thyroid was normal and there were no nodules present [No Respiratory Distress] : no respiratory distress  [No Accessory Muscle Use] : no accessory muscle use [Clear to Auscultation] : lungs were clear to auscultation bilaterally [Normal Rate] : normal rate  [Regular Rhythm] : with a regular rhythm [Normal S1, S2] : normal S1 and S2 [No Murmur] : no murmur heard [No Carotid Bruits] : no carotid bruits [No Abdominal Bruit] : a ~M bruit was not heard ~T in the abdomen [No Varicosities] : no varicosities [Pedal Pulses Present] : the pedal pulses are present [No Edema] : there was no peripheral edema [No Palpable Aorta] : no palpable aorta [No Extremity Clubbing/Cyanosis] : no extremity clubbing/cyanosis [Soft] : abdomen soft [Non Tender] : non-tender [No Masses] : no abdominal mass palpated [Non-distended] : non-distended [No HSM] : no HSM [Normal Bowel Sounds] : normal bowel sounds [Normal Posterior Cervical Nodes] : no posterior cervical lymphadenopathy [Normal Anterior Cervical Nodes] : no anterior cervical lymphadenopathy [No CVA Tenderness] : no CVA  tenderness [No Joint Swelling] : no joint swelling [No Spinal Tenderness] : no spinal tenderness [Grossly Normal Strength/Tone] : grossly normal strength/tone [No Rash] : no rash [Coordination Grossly Intact] : coordination grossly intact [No Focal Deficits] : no focal deficits [Normal Gait] : normal gait [Deep Tendon Reflexes (DTR)] : deep tendon reflexes were 2+ and symmetric [Normal Affect] : the affect was normal [Normal Insight/Judgement] : insight and judgment were intact

## 2021-07-26 LAB
ALBUMIN SERPL ELPH-MCNC: 5 G/DL
ALP BLD-CCNC: 96 U/L
ALT SERPL-CCNC: 307 U/L
ANION GAP SERPL CALC-SCNC: 17 MMOL/L
AST SERPL-CCNC: 103 U/L
BASOPHILS # BLD AUTO: 0.04 K/UL
BASOPHILS NFR BLD AUTO: 0.5 %
BILIRUB SERPL-MCNC: 0.6 MG/DL
BUN SERPL-MCNC: 19 MG/DL
CALCIUM SERPL-MCNC: 10.4 MG/DL
CHLORIDE SERPL-SCNC: 102 MMOL/L
CO2 SERPL-SCNC: 20 MMOL/L
CREAT SERPL-MCNC: 1 MG/DL
EOSINOPHIL # BLD AUTO: 0.27 K/UL
EOSINOPHIL NFR BLD AUTO: 3.2 %
ERYTHROCYTE [SEDIMENTATION RATE] IN BLOOD BY WESTERGREN METHOD: 52 MM/HR
ESTIMATED AVERAGE GLUCOSE: 120 MG/DL
GGT SERPL-CCNC: 104 U/L
GLUCOSE SERPL-MCNC: 96 MG/DL
HBA1C MFR BLD HPLC: 5.8 %
HCT VFR BLD CALC: 44.5 %
HGB BLD-MCNC: 13.7 G/DL
IMM GRANULOCYTES NFR BLD AUTO: 0.2 %
LYMPHOCYTES # BLD AUTO: 4.07 K/UL
LYMPHOCYTES NFR BLD AUTO: 48.7 %
MAN DIFF?: NORMAL
MCHC RBC-ENTMCNC: 22 PG
MCHC RBC-ENTMCNC: 30.8 GM/DL
MCV RBC AUTO: 71.5 FL
MONOCYTES # BLD AUTO: 0.52 K/UL
MONOCYTES NFR BLD AUTO: 6.2 %
NEUTROPHILS # BLD AUTO: 3.43 K/UL
NEUTROPHILS NFR BLD AUTO: 41.2 %
PLATELET # BLD AUTO: 183 K/UL
POTASSIUM SERPL-SCNC: 4.4 MMOL/L
PROLACTIN SERPL-MCNC: 8.8 NG/ML
PROT SERPL-MCNC: 7.9 G/DL
RBC # BLD: 6.22 M/UL
RBC # FLD: 17.8 %
SODIUM SERPL-SCNC: 139 MMOL/L
THYROGLOB AB SERPL-ACNC: <20 IU/ML
THYROPEROXIDASE AB SERPL IA-ACNC: 43.2 IU/ML
TSH SERPL-ACNC: 1.66 UIU/ML
WBC # FLD AUTO: 8.35 K/UL

## 2021-07-27 LAB — ANACR T: NEGATIVE

## 2021-07-28 ENCOUNTER — APPOINTMENT (OUTPATIENT)
Dept: HEPATOLOGY | Facility: CLINIC | Age: 36
End: 2021-07-28
Payer: COMMERCIAL

## 2021-07-28 VITALS
BODY MASS INDEX: 25.76 KG/M2 | HEART RATE: 86 BPM | WEIGHT: 170 LBS | SYSTOLIC BLOOD PRESSURE: 125 MMHG | DIASTOLIC BLOOD PRESSURE: 80 MMHG | RESPIRATION RATE: 16 BRPM | TEMPERATURE: 97.3 F | HEIGHT: 68 IN | OXYGEN SATURATION: 98 %

## 2021-07-28 LAB — TOTAL T3-ESOTERIX: 139 NG/DL

## 2021-07-28 PROCEDURE — 99204 OFFICE O/P NEW MOD 45 MIN: CPT

## 2021-07-30 LAB
A1AT SERPL-MCNC: 141 MG/DL
ALBUMIN SERPL ELPH-MCNC: 4.9 G/DL
ALP BLD-CCNC: 88 U/L
ALT SERPL-CCNC: 330 U/L
ANA SER IF-ACNC: NEGATIVE
ANION GAP SERPL CALC-SCNC: 14 MMOL/L
AST SERPL-CCNC: 109 U/L
BASOPHILS # BLD AUTO: 0.04 K/UL
BASOPHILS NFR BLD AUTO: 0.5 %
BILIRUB DIRECT SERPL-MCNC: 0.2 MG/DL
BILIRUB INDIRECT SERPL-MCNC: 0.5 MG/DL
BILIRUB SERPL-MCNC: 0.7 MG/DL
BUN SERPL-MCNC: 12 MG/DL
CALCIUM SERPL-MCNC: 10.1 MG/DL
CERULOPLASMIN SERPL-MCNC: 29 MG/DL
CHLORIDE SERPL-SCNC: 102 MMOL/L
CMV IGG SERPL QL: 4.2 U/ML
CMV IGG SERPL-IMP: POSITIVE
CMV IGM SERPL QL: <8 AU/ML
CMV IGM SERPL QL: NEGATIVE
CO2 SERPL-SCNC: 23 MMOL/L
COPPER SERPL-MCNC: 117 UG/DL
CREAT SERPL-MCNC: 0.91 MG/DL
DEPRECATED KAPPA LC FREE/LAMBDA SER: 0.98 RATIO
EBV EA AB SER IA-ACNC: <5 U/ML
EBV EA AB TITR SER IF: POSITIVE
EBV EA IGG SER QL IA: >600 U/ML
EBV EA IGG SER-ACNC: NEGATIVE
EBV EA IGM SER IA-ACNC: NEGATIVE
EBV PATRN SPEC IB-IMP: NORMAL
EBV VCA IGG SER IA-ACNC: >750 U/ML
EBV VCA IGM SER QL IA: <10 U/ML
EOSINOPHIL # BLD AUTO: 0.19 K/UL
EOSINOPHIL NFR BLD AUTO: 2.3 %
EPSTEIN-BARR VIRUS CAPSID ANTIGEN IGG: POSITIVE
FERRITIN SERPL-MCNC: 525 NG/ML
GLUCOSE SERPL-MCNC: 121 MG/DL
HAV IGM SER QL: NONREACTIVE
HBV CORE IGG+IGM SER QL: NONREACTIVE
HBV SURFACE AB SER QL: REACTIVE
HBV SURFACE AG SER QL: NONREACTIVE
HCT VFR BLD CALC: 44.5 %
HCV AB SER QL: NONREACTIVE
HCV S/CO RATIO: 0.09 S/CO
HEPATITIS A IGG ANTIBODY: NONREACTIVE
HGB BLD-MCNC: 13.9 G/DL
HIV1+2 AB SPEC QL IA.RAPID: NONREACTIVE
IGA SER QL IEP: 222 MG/DL
IGG SER QL IEP: 1235 MG/DL
IGG4 SER-MCNC: 284 MG/DL
IGM SER QL IEP: 48 MG/DL
IMM GRANULOCYTES NFR BLD AUTO: 0.4 %
INR PPP: 1.06 RATIO
IRON SATN MFR SERPL: 25 %
IRON SERPL-MCNC: 110 UG/DL
KAPPA LC CSF-MCNC: 1.63 MG/DL
KAPPA LC SERPL-MCNC: 1.6 MG/DL
LKM AB SER QL IF: <20.1 UNITS
LYMPHOCYTES # BLD AUTO: 4.06 K/UL
LYMPHOCYTES NFR BLD AUTO: 49.5 %
M TB IFN-G BLD-IMP: NEGATIVE
MAN DIFF?: NORMAL
MCHC RBC-ENTMCNC: 22.2 PG
MCHC RBC-ENTMCNC: 31.2 GM/DL
MCV RBC AUTO: 71.2 FL
MITOCHONDRIA AB SER IF-ACNC: NORMAL
MONOCYTES # BLD AUTO: 0.46 K/UL
MONOCYTES NFR BLD AUTO: 5.6 %
NEUTROPHILS # BLD AUTO: 3.43 K/UL
NEUTROPHILS NFR BLD AUTO: 41.7 %
PLATELET # BLD AUTO: 180 K/UL
POTASSIUM SERPL-SCNC: 4.3 MMOL/L
PROT SERPL-MCNC: 7.8 G/DL
PT BLD: 12.5 SEC
QUANTIFERON TB PLUS MITOGEN MINUS NIL: 9.91 IU/ML
QUANTIFERON TB PLUS NIL: 0.03 IU/ML
QUANTIFERON TB PLUS TB1 MINUS NIL: 0.01 IU/ML
QUANTIFERON TB PLUS TB2 MINUS NIL: 0.01 IU/ML
RBC # BLD: 6.25 M/UL
RBC # FLD: 17.4 %
SMOOTH MUSCLE AB SER QL IF: NORMAL
SODIUM SERPL-SCNC: 139 MMOL/L
TIBC SERPL-MCNC: 434 UG/DL
UIBC SERPL-MCNC: 325 UG/DL
WBC # FLD AUTO: 8.21 K/UL

## 2021-07-30 NOTE — REVIEW OF SYSTEMS
[Itching] : itching [Negative] : Respiratory [Vomiting] : no vomiting [Constipation] : no constipation [Diarrhea] : no diarrhea [Heartburn] : no heartburn [Melena] : no melena [Dysuria] : no dysuria [Confused] : no confusion [Easy Bleeding] : no tendency for easy bleeding [Easy Bruising] : no tendency for easy bruising [FreeTextEntry6] : Recent URI [FreeTextEntry7] : Epigastric pain in evening  [FreeTextEntry9] : Chronic lower back pain, and R shoulder pain since rotator cuff surgery [de-identified] : Reports dry skin [de-identified] : L breast lump

## 2021-07-30 NOTE — HISTORY OF PRESENT ILLNESS
[FreeTextEntry1] : 34 yo Male with Hx of prediabetes, chronic low back and R shoulder pain (s/p rotator cuff surgery), infertility, noted URI symptoms since the beginning of June, and has been treated with antibiotics, including Amoxicillin vs. Augmentin, for extended period (3 weeks?) and has been off antibiotics for the last 2 weeks. He has been experiencing night sweats since then, as well as itching and mostly epigastric pain at night. \par He was noted to have elevated transaminases. He was noted to have elevated liver enzymes in 2/2021 and been increasing since then, with acute rise this July. Latter one could be at least partially related to the acute URI and amoxicillin. However, he does have underlying liver disease, chronic elevation of liver enzymes. \par He reported more sedentary lifestyle since the pandemic, working from home, in IT sector. He reported that had negative COVID-19 test. \par He is also being evaluated for L breast "nodule". He has pending breast US. \par \par He is here for initial evaluation, accompanied by wife. \par \par

## 2021-07-30 NOTE — PHYSICAL EXAM
[Non-Tender] : non-tender [General Appearance - Alert] : alert [General Appearance - In No Acute Distress] : in no acute distress [General Appearance - Well Nourished] : well nourished [General Appearance - Well Developed] : well developed [General Appearance - Well-Appearing] : healthy appearing [Sclera] : the sclera and conjunctiva were normal [Oropharynx] : the oropharynx was normal [Neck Appearance] : the appearance of the neck was normal [Respiration, Rhythm And Depth] : normal respiratory rhythm and effort [] : no respiratory distress [Exaggerated Use Of Accessory Muscles For Inspiration] : no accessory muscle use [Auscultation Breath Sounds / Voice Sounds] : lungs were clear to auscultation bilaterally [Heart Rate And Rhythm] : heart rate was normal and rhythm regular [Heart Sounds] : normal S1 and S2 [Edema] : there was no peripheral edema [Rounded] : rounded [Normal] : normal [Soft, Nontender] : the abdomen was soft and nontender [No Mass] : no masses were palpated [None] : no CVA tenderness [Cervical Lymph Nodes Enlarged Posterior Bilaterally] : posterior cervical [Cervical Lymph Nodes Enlarged Anterior Bilaterally] : anterior cervical [Supraclavicular Lymph Nodes Enlarged Bilaterally] : supraclavicular [Axillary Lymph Nodes Enlarged Bilaterally] : axillary [Inguinal Lymph Nodes Enlarged Bilaterally] : inguinal [No CVA Tenderness] : no ~M costovertebral angle tenderness [No Spinal Tenderness] : no spinal tenderness [Abnormal Walk] : normal gait [Skin Color & Pigmentation] : normal skin color and pigmentation [Skin Turgor] : normal skin turgor [Oriented To Time, Place, And Person] : oriented to person, place, and time [Impaired Insight] : insight and judgment were intact [Affect] : the affect was normal [Mood] : the mood was normal [Scleral Icterus] : No Scleral Icterus [Spider Angioma] : No spider angioma(s) were observed [Abdominal  Ascites] : no ascites [Liver Palpable ___ Finger Breadths Below Costal Margin] : was not palpable below costal margin [Asterixis] : no asterixis observed [Jaundice] : No jaundice [Palmar Erythema] : no Palmar Erythema [Depression] : no depression [Dilated Collat. Veins] : no collateral vein dilation [Striae] : no striae [Firm] : not firm [Rigid] : not rigid [Rebound] : no rebound [Guarding] : no guarding [Delatorre's] : a negative Delatorre's sign [Liver Tender To Palpation] : not tender [FreeTextEntry1] : Grossly intact

## 2021-07-30 NOTE — ASSESSMENT
[FreeTextEntry1] : 34 yo Male with Hx of prediabetes, chronic low back and R shoulder pain (s/p rotator cuff surgery), infertility, recent prolonged URI symptoms since the beginning of June, treated with antibiotics, including Amoxicillin vs. Augmentin, for extended period (3 weeks?), off antibiotics for the last 2 weeks, has been referred for abnormal liver enzymes. \par \par Moderately elevated liver enzymes in hepatocellular pattern, with normal cholestatic parameters, with intact synthetic function, chronic with acute rise\par - Acute rise partially could be due to URI and Amoxicillin, however, it does not explain chronic findings\par - Ordered US abd\par - Ordered blood work as below, r/o viral , autoimmune or other etiologies as below\par - Further workup depend on results\par \par RTC 2 weeks

## 2021-07-31 ENCOUNTER — APPOINTMENT (OUTPATIENT)
Dept: RADIOLOGY | Facility: CLINIC | Age: 36
End: 2021-07-31
Payer: COMMERCIAL

## 2021-07-31 ENCOUNTER — APPOINTMENT (OUTPATIENT)
Dept: ULTRASOUND IMAGING | Facility: CLINIC | Age: 36
End: 2021-07-31
Payer: COMMERCIAL

## 2021-07-31 ENCOUNTER — OUTPATIENT (OUTPATIENT)
Dept: OUTPATIENT SERVICES | Facility: HOSPITAL | Age: 36
LOS: 1 days | End: 2021-07-31
Payer: COMMERCIAL

## 2021-07-31 DIAGNOSIS — Z00.8 ENCOUNTER FOR OTHER GENERAL EXAMINATION: ICD-10-CM

## 2021-07-31 LAB
CMV DNA SPEC QL NAA+PROBE: NOT DETECTED IU/ML
EBV DNA SERPL NAA+PROBE-ACNC: NOT DETECTED IU/ML
TTG IGA SER IA-ACNC: <1.2 U/ML
TTG IGA SER-ACNC: NEGATIVE

## 2021-07-31 PROCEDURE — 71046 X-RAY EXAM CHEST 2 VIEWS: CPT | Mod: 26

## 2021-07-31 PROCEDURE — 76700 US EXAM ABDOM COMPLETE: CPT | Mod: 26

## 2021-07-31 PROCEDURE — 76700 US EXAM ABDOM COMPLETE: CPT

## 2021-07-31 PROCEDURE — 71046 X-RAY EXAM CHEST 2 VIEWS: CPT

## 2021-08-01 LAB — T4 FREE SERPL DIALY-MCNC: 1.3 NG/DL

## 2021-08-05 LAB
COVID-19 NUCLEOCAPSID  GAM ANTIBODY INTERPRETATION: NEGATIVE
COVID-19 SPIKE DOMAIN ANTIBODY INTERPRETATION: NEGATIVE
SARS-COV-2 AB SERPL IA-ACNC: 0.4 U/ML
SARS-COV-2 AB SERPL QL IA: 0.07 INDEX

## 2021-08-07 LAB
B BURGDOR AB SER-IMP: NEGATIVE
B BURGDOR IGG+IGM SER QL: 0.05 INDEX
C3 SERPL-MCNC: 158 MG/DL
C4 SERPL-MCNC: 41 MG/DL
IGE SER-MCNC: 257 KU/L
SARS-COV-2 N GENE NPH QL NAA+PROBE: NOT DETECTED

## 2021-08-08 LAB
HERPES SIMPLEX 1 DNA: NOT DETECTED
HERPES SIMPLEX 2 DNA: NOT DETECTED
HERPES SIMPLEX SPECIMEN SOURCE: NORMAL

## 2021-08-09 LAB — HFE GENE MUT ANL BLD/T: NORMAL

## 2021-08-10 LAB — VARICELLA ZOSTER VIRUS (VZV), DNA PCR: NEGATIVE

## 2021-08-11 LAB — HEPATITIS E IGM ABY: NORMAL

## 2021-08-12 LAB — HEV AB SER QL: NEGATIVE

## 2021-08-13 ENCOUNTER — APPOINTMENT (OUTPATIENT)
Dept: MAMMOGRAPHY | Facility: CLINIC | Age: 36
End: 2021-08-13

## 2021-08-13 ENCOUNTER — APPOINTMENT (OUTPATIENT)
Dept: ULTRASOUND IMAGING | Facility: CLINIC | Age: 36
End: 2021-08-13

## 2021-08-22 ENCOUNTER — APPOINTMENT (OUTPATIENT)
Dept: CARDIOLOGY | Facility: CLINIC | Age: 36
End: 2021-08-22

## 2021-10-01 ENCOUNTER — APPOINTMENT (OUTPATIENT)
Dept: HEPATOLOGY | Facility: CLINIC | Age: 36
End: 2021-10-01

## 2021-10-04 ENCOUNTER — LABORATORY RESULT (OUTPATIENT)
Age: 36
End: 2021-10-04

## 2021-10-04 LAB
ALBUMIN SERPL ELPH-MCNC: 4.7 G/DL
ALP BLD-CCNC: 99 U/L
ALT SERPL-CCNC: 271 U/L
AST SERPL-CCNC: 84 U/L
BILIRUB DIRECT SERPL-MCNC: 0.1 MG/DL
BILIRUB INDIRECT SERPL-MCNC: 0.4 MG/DL
BILIRUB SERPL-MCNC: 0.5 MG/DL
INR PPP: 0.98 RATIO
PROT SERPL-MCNC: 7.5 G/DL
PT BLD: 11.7 SEC

## 2021-10-07 LAB
APTT BLD: 43.2 SEC
CK SERPL-CCNC: 81 U/L
HSV 1+2 IGG SER IA-IMP: NEGATIVE
HSV 1+2 IGG SER IA-IMP: NEGATIVE
HSV1 IGG SER QL: 0.13 INDEX
HSV2 IGG SER QL: 0.06 INDEX

## 2021-10-08 ENCOUNTER — APPOINTMENT (OUTPATIENT)
Dept: HEPATOLOGY | Facility: CLINIC | Age: 36
End: 2021-10-08

## 2021-10-08 LAB
ALBUMIN MFR SERPL ELPH: 56.6 %
ALBUMIN SERPL-MCNC: 4.4 G/DL
ALBUMIN/GLOB SERPL: 1.3 RATIO
ALPHA1 GLOB MFR SERPL ELPH: 4.2 %
ALPHA1 GLOB SERPL ELPH-MCNC: 0.3 G/DL
ALPHA2 GLOB MFR SERPL ELPH: 10.9 %
ALPHA2 GLOB SERPL ELPH-MCNC: 0.9 G/DL
B-GLOBULIN MFR SERPL ELPH: 15.9 %
B-GLOBULIN SERPL ELPH-MCNC: 1.2 G/DL
GAMMA GLOB FLD ELPH-MCNC: 1 G/DL
GAMMA GLOB MFR SERPL ELPH: 12.4 %
INTERPRETATION SERPL IEP-IMP: NORMAL
PROT SERPL-MCNC: 7.8 G/DL
PROT SERPL-MCNC: 7.8 G/DL

## 2021-10-09 ENCOUNTER — APPOINTMENT (OUTPATIENT)
Dept: MRI IMAGING | Facility: HOSPITAL | Age: 36
End: 2021-10-09
Payer: COMMERCIAL

## 2021-10-09 ENCOUNTER — OUTPATIENT (OUTPATIENT)
Dept: OUTPATIENT SERVICES | Facility: HOSPITAL | Age: 36
LOS: 1 days | End: 2021-10-09
Payer: COMMERCIAL

## 2021-10-09 DIAGNOSIS — D89.89 OTHER SPECIFIED DISORDERS INVOLVING THE IMMUNE MECHANISM, NOT ELSEWHERE CLASSIFIED: ICD-10-CM

## 2021-10-09 DIAGNOSIS — R79.89 OTHER SPECIFIED ABNORMAL FINDINGS OF BLOOD CHEMISTRY: ICD-10-CM

## 2021-10-09 PROCEDURE — 74183 MRI ABD W/O CNTR FLWD CNTR: CPT | Mod: 26

## 2021-10-09 PROCEDURE — 74183 MRI ABD W/O CNTR FLWD CNTR: CPT

## 2021-10-11 LAB
HSV1 IGM SER QL: NEGATIVE
HSV2 AB FLD-ACNC: NEGATIVE
LEPTOSPIRA AB SER QL: NEGATIVE

## 2021-10-12 LAB — SOLUBLE LIVER IGG SER IA-ACNC: 0.7

## 2021-10-21 ENCOUNTER — APPOINTMENT (OUTPATIENT)
Dept: INTERNAL MEDICINE | Facility: CLINIC | Age: 36
End: 2021-10-21

## 2021-10-29 ENCOUNTER — APPOINTMENT (OUTPATIENT)
Dept: PULMONOLOGY | Facility: CLINIC | Age: 36
End: 2021-10-29
Payer: COMMERCIAL

## 2021-10-29 VITALS
OXYGEN SATURATION: 99 % | HEIGHT: 68 IN | WEIGHT: 168 LBS | SYSTOLIC BLOOD PRESSURE: 142 MMHG | HEART RATE: 89 BPM | BODY MASS INDEX: 25.46 KG/M2 | TEMPERATURE: 97.6 F | DIASTOLIC BLOOD PRESSURE: 92 MMHG | RESPIRATION RATE: 16 BRPM

## 2021-10-29 DIAGNOSIS — R07.9 CHEST PAIN, UNSPECIFIED: ICD-10-CM

## 2021-10-29 DIAGNOSIS — R06.83 SNORING: ICD-10-CM

## 2021-10-29 PROCEDURE — 99213 OFFICE O/P EST LOW 20 MIN: CPT

## 2021-10-29 NOTE — PHYSICAL EXAM
[I] : Mallampati Class: I [1+] : Right Tonsil: 1+ [Normal Appearance] : normal appearance [No Neck Mass] : no neck mass [Normal Rate/Rhythm] : normal rate/rhythm [Normal S1, S2] : normal s1, s2 [No Murmurs] : no murmurs [No Resp Distress] : no resp distress [Clear to Auscultation Bilaterally] : clear to auscultation bilaterally [No Abnormalities] : no abnormalities [Not Tender] : not tender [No Masses] : no masses [No Clubbing] : no clubbing [No Edema] : no edema [Oriented x3] : oriented x3 [TextBox_11] : patent posterio phanx

## 2021-10-29 NOTE — DISCUSSION/SUMMARY
[FreeTextEntry1] : He has heavy snoring, daytime somnolence\par \par He is undergoing evaluation prior to correction of protruding jaw\par \par he has patent posterior pharynx, normal tonsils\par he is snoring and reports poor sleep\par \par PLAN\par \par he will undergo Home sleep study\par \par He will be referred for evaluation by  ENT\par \par Zackary Martin MD

## 2021-10-29 NOTE — HISTORY OF PRESENT ILLNESS
[TextBox_4] : 35 year old man who presented initially in 2019 due to left sided and lower sternal chest pain with breathing, ongoing cough with nasal congestion. \par He still has chest pain\par \par He  now presents because he has been snoring.  His wife recorded him and presented to me. She states he does not stop breathing but snores throughout the night.  He notes that he does awaken from sleep.    He has some daytime somnolence\par \par He is undergoing evaluation and will have surgery to correct protruding jaw with underbite  He will have corrective surgery.  he is wearing Invisalign.\par \par SH\par \par former smoker since age 17 who stopped 3 years ago , 1 pack per month\par \par ETOH: occasional\par \par \par ROS:\par no significant GERD\par \par \par PSH\par \par right shoulder injury\par \par PMH\par \par occasional back pain\par \par \par No history of asthma\par \par \par ALLERGIES\par \par nut, other foods\par \par some seasonal allergy, mild\par \par dust roaches, dust mite\par \par he has seen allergist\par MEDS:\par \par naproxen as needed\par \par Family History:\par \par There is no family history of cancer, chronic lung disease. \par  [Hypersomnolence] : denies hypersomnolence [Snoring] : snoring [Unintentional Sleep while Active] : no unintentional sleep while active [Unintentional Sleep while Inactive] : no unintentional sleep while inactive

## 2022-01-21 ENCOUNTER — APPOINTMENT (OUTPATIENT)
Dept: HEPATOLOGY | Facility: CLINIC | Age: 37
End: 2022-01-21

## 2022-04-26 ENCOUNTER — APPOINTMENT (OUTPATIENT)
Dept: SLEEP CENTER | Facility: CLINIC | Age: 37
End: 2022-04-26

## 2022-04-28 RX ORDER — HYDROCODONE BITARTRATE AND HOMATROPINE METHYLBROMIDE 1.5; 5 MG/5ML; MG/5ML
5-1.5 SOLUTION ORAL EVERY 6 HOURS
Qty: 240 | Refills: 0 | Status: ACTIVE | COMMUNITY
Start: 2022-04-26 | End: 1900-01-01

## 2022-05-04 LAB
ALBUMIN SERPL ELPH-MCNC: 4.7 G/DL
ALP BLD-CCNC: 88 U/L
ALT SERPL-CCNC: 288 U/L
ANION GAP SERPL CALC-SCNC: 13 MMOL/L
AST SERPL-CCNC: 74 U/L
BASOPHILS # BLD AUTO: 0.05 K/UL
BASOPHILS NFR BLD AUTO: 0.6 %
BILIRUB DIRECT SERPL-MCNC: 0.2 MG/DL
BILIRUB INDIRECT SERPL-MCNC: 0.4 MG/DL
BILIRUB SERPL-MCNC: 0.6 MG/DL
BUN SERPL-MCNC: 10 MG/DL
CALCIUM SERPL-MCNC: 9.9 MG/DL
CHLORIDE SERPL-SCNC: 104 MMOL/L
CHOLEST SERPL-MCNC: 150 MG/DL
CK SERPL-CCNC: 90 U/L
CO2 SERPL-SCNC: 26 MMOL/L
CREAT SERPL-MCNC: 0.93 MG/DL
EGFR: 110 ML/MIN/1.73M2
EOSINOPHIL # BLD AUTO: 0.28 K/UL
EOSINOPHIL NFR BLD AUTO: 3.5 %
ESTIMATED AVERAGE GLUCOSE: 134 MG/DL
FERRITIN SERPL-MCNC: 315 NG/ML
GGT SERPL-CCNC: 136 U/L
GLUCOSE SERPL-MCNC: 96 MG/DL
HBA1C MFR BLD HPLC: 6.3 %
HCT VFR BLD CALC: 44.8 %
HDLC SERPL-MCNC: 38 MG/DL
HGB BLD-MCNC: 13.4 G/DL
IMM GRANULOCYTES NFR BLD AUTO: 0.3 %
INR PPP: 1.05 RATIO
IRON SATN MFR SERPL: 23 %
IRON SERPL-MCNC: 92 UG/DL
LDLC SERPL CALC-MCNC: 90 MG/DL
LYMPHOCYTES # BLD AUTO: 4.39 K/UL
LYMPHOCYTES NFR BLD AUTO: 55.2 %
MAN DIFF?: NORMAL
MCHC RBC-ENTMCNC: 21.9 PG
MCHC RBC-ENTMCNC: 29.9 GM/DL
MCV RBC AUTO: 73.2 FL
MONOCYTES # BLD AUTO: 0.51 K/UL
MONOCYTES NFR BLD AUTO: 6.4 %
NEUTROPHILS # BLD AUTO: 2.71 K/UL
NEUTROPHILS NFR BLD AUTO: 34 %
NONHDLC SERPL-MCNC: 112 MG/DL
PLATELET # BLD AUTO: 187 K/UL
POTASSIUM SERPL-SCNC: 4.1 MMOL/L
PROT SERPL-MCNC: 7.3 G/DL
PT BLD: 12.2 SEC
RBC # BLD: 6.12 M/UL
RBC # FLD: 17.9 %
SODIUM SERPL-SCNC: 142 MMOL/L
TIBC SERPL-MCNC: 400 UG/DL
TRIGL SERPL-MCNC: 108 MG/DL
TSH SERPL-ACNC: 1.2 UIU/ML
UIBC SERPL-MCNC: 308 UG/DL
WBC # FLD AUTO: 7.96 K/UL

## 2022-05-06 LAB
IGE SER-MCNC: 246 KU/L
IGG4 SER-MCNC: 247 MG/DL

## 2022-05-09 ENCOUNTER — APPOINTMENT (OUTPATIENT)
Dept: INTERNAL MEDICINE | Facility: CLINIC | Age: 37
End: 2022-05-09
Payer: COMMERCIAL

## 2022-05-09 VITALS
BODY MASS INDEX: 26.07 KG/M2 | HEART RATE: 79 BPM | SYSTOLIC BLOOD PRESSURE: 118 MMHG | HEIGHT: 68 IN | DIASTOLIC BLOOD PRESSURE: 83 MMHG | OXYGEN SATURATION: 96 % | WEIGHT: 172 LBS

## 2022-05-09 DIAGNOSIS — N46.9 MALE INFERTILITY, UNSPECIFIED: ICD-10-CM

## 2022-05-09 DIAGNOSIS — J45.909 UNSPECIFIED ASTHMA, UNCOMPLICATED: ICD-10-CM

## 2022-05-09 LAB
BILIRUB UR QL STRIP: NORMAL
CLARITY UR: CLEAR
COLLECTION METHOD: NORMAL
GLUCOSE UR-MCNC: NORMAL
HCG UR QL: 0.2 EU/DL
HGB UR QL STRIP.AUTO: NORMAL
KETONES UR-MCNC: NORMAL
LEUKOCYTE ESTERASE UR QL STRIP: NORMAL
NITRITE UR QL STRIP: NORMAL
PH UR STRIP: 6
PHOSPHATIDYETHANOL (PETH), WHOLE BLOOD: 259 NG/ML
PROT UR STRIP-MCNC: NORMAL
SP GR UR STRIP: 1.02

## 2022-05-09 PROCEDURE — G0444 DEPRESSION SCREEN ANNUAL: CPT | Mod: 59

## 2022-05-09 PROCEDURE — 81003 URINALYSIS AUTO W/O SCOPE: CPT | Mod: QW

## 2022-05-09 PROCEDURE — 36415 COLL VENOUS BLD VENIPUNCTURE: CPT

## 2022-05-09 PROCEDURE — 99395 PREV VISIT EST AGE 18-39: CPT | Mod: 25

## 2022-05-09 RX ORDER — CYCLOBENZAPRINE HYDROCHLORIDE 10 MG/1
10 TABLET, FILM COATED ORAL
Qty: 60 | Refills: 3 | Status: DISCONTINUED | COMMUNITY
Start: 2020-03-25 | End: 2022-05-09

## 2022-05-09 RX ORDER — CYCLOBENZAPRINE HYDROCHLORIDE 10 MG/1
10 TABLET, FILM COATED ORAL
Qty: 60 | Refills: 3 | Status: DISCONTINUED | COMMUNITY
Start: 2019-03-26 | End: 2022-05-09

## 2022-05-09 NOTE — HISTORY OF PRESENT ILLNESS
[FreeTextEntry1] : had  covid  for the most part feels recovered.  sees hepatologist  for [facility]  has worse  a1c

## 2022-05-09 NOTE — HEALTH RISK ASSESSMENT
DR Petra Coronel PT ON DROPS. [Good] : ~his/her~  mood as  good [No] : In the past 12 months have you used drugs other than those required for medical reasons? No [0] : 2) Feeling down, depressed, or hopeless: Not at all (0) [PHQ-2 Negative - No further assessment needed] : PHQ-2 Negative - No further assessment needed [de-identified] : ok  but some yamilka gain [[id number]] : 0

## 2022-05-11 ENCOUNTER — APPOINTMENT (OUTPATIENT)
Dept: ORTHOPEDIC SURGERY | Facility: CLINIC | Age: 37
End: 2022-05-11

## 2022-05-11 LAB
TESTOST FREE SERPL-MCNC: 10.6 PG/ML
TESTOST SERPL-MCNC: 243 NG/DL

## 2022-05-13 ENCOUNTER — APPOINTMENT (OUTPATIENT)
Dept: RADIOLOGY | Facility: CLINIC | Age: 37
End: 2022-05-13
Payer: COMMERCIAL

## 2022-05-13 ENCOUNTER — OUTPATIENT (OUTPATIENT)
Dept: OUTPATIENT SERVICES | Facility: HOSPITAL | Age: 37
LOS: 1 days | End: 2022-05-13
Payer: COMMERCIAL

## 2022-05-13 ENCOUNTER — APPOINTMENT (OUTPATIENT)
Dept: HEPATOLOGY | Facility: CLINIC | Age: 37
End: 2022-05-13
Payer: COMMERCIAL

## 2022-05-13 DIAGNOSIS — R76.8 OTHER SPECIFIED ABNORMAL IMMUNOLOGICAL FINDINGS IN SERUM: ICD-10-CM

## 2022-05-13 DIAGNOSIS — D72.820 LYMPHOCYTOSIS (SYMPTOMATIC): ICD-10-CM

## 2022-05-13 DIAGNOSIS — R71.8 OTHER ABNORMALITY OF RED BLOOD CELLS: ICD-10-CM

## 2022-05-13 DIAGNOSIS — E66.3 OVERWEIGHT: ICD-10-CM

## 2022-05-13 DIAGNOSIS — Z78.9 OTHER SPECIFIED HEALTH STATUS: ICD-10-CM

## 2022-05-13 DIAGNOSIS — Z00.8 ENCOUNTER FOR OTHER GENERAL EXAMINATION: ICD-10-CM

## 2022-05-13 PROCEDURE — 72100 X-RAY EXAM L-S SPINE 2/3 VWS: CPT | Mod: 26

## 2022-05-13 PROCEDURE — 72100 X-RAY EXAM L-S SPINE 2/3 VWS: CPT

## 2022-05-13 PROCEDURE — 99214 OFFICE O/P EST MOD 30 MIN: CPT

## 2022-05-14 PROBLEM — Z78.9 SOCIAL ALCOHOL USE: Status: ACTIVE | Noted: 2018-08-21

## 2022-05-14 PROBLEM — D72.820 RELATIVE LYMPHOCYTOSIS: Status: ACTIVE | Noted: 2021-07-30

## 2022-05-14 PROBLEM — R76.8 IGG4 SELECTIVELY HIGH IN PLASMA: Status: ACTIVE | Noted: 2022-05-06

## 2022-05-14 PROBLEM — E66.3 OVERWEIGHT (BMI 25.0-29.9): Status: ACTIVE | Noted: 2022-05-14

## 2022-05-14 PROBLEM — R71.8 MICROCYTOSIS: Status: ACTIVE | Noted: 2022-05-14

## 2022-05-14 RX ORDER — SODIUM CHLORIDE 2.65%
2.65 AEROSOL, SPRAY (ML) NASAL TWICE DAILY
Qty: 1 | Refills: 5 | Status: DISCONTINUED | COMMUNITY
Start: 2019-10-28 | End: 2022-05-14

## 2022-05-14 RX ORDER — OXYCODONE HYDROCHLORIDE 5 MG/1
5 CAPSULE ORAL EVERY 6 HOURS
Qty: 30 | Refills: 0 | Status: DISCONTINUED | COMMUNITY
Start: 2019-02-17 | End: 2022-05-14

## 2022-05-14 RX ORDER — IBUPROFEN 600 MG/1
600 TABLET, FILM COATED ORAL
Refills: 0 | Status: DISCONTINUED | COMMUNITY
End: 2022-05-14

## 2022-05-14 RX ORDER — GUAIFENESIN AND DEXTROMETHORPHAN HYDROBROMIDE 600; 30 MG/1; MG/1
30-600 TABLET, EXTENDED RELEASE ORAL DAILY
Qty: 30 | Refills: 0 | Status: DISCONTINUED | COMMUNITY
Start: 2019-10-28 | End: 2022-05-14

## 2022-05-14 NOTE — ASSESSMENT
[FreeTextEntry1] : 35 yo overweight (BMI 26) Male with Hx of prediabetes (HBA1c 5.7->6.3), chronic low back and R shoulder pain (s/p rotator cuff surgery), infertility, prolonged URI symptoms in 6/2021 treated with extended course of antibiotics (3 weeks ?, Augmentin?), and c/b prolonged course of night sweats, also epigastric / LUQ pain, was referred in 7/2021 for abnormal liver enzymes. \par He was noted to have mildly elevated liver enzymes in hepatocellular pattern in 2/2021 and been worsening / quadrupled by 7/2021 (AST 36->109, ALT 75->330), with normal cholestatic parameters and synthetic function.  He was not aware of liver disease previously. He reported more sedentary lifestyle since the start of the pandemic, working from home, in IT sector. \par Liver workup showed high IgG4 (3x ULN) and IgE, slightly elevated C3/C4 levels. SKYLA neg, SMA neg, LKM neg, AMA neg. It also showed elevated ferritin (525), but with normal transferrin saturation (25%) and negative HFE mutation. Otherwise was unremarkable. US abd 8/3/21 showed mild hepatomegaly, hepatic steatosis, otherwise was unremarkable. MRI abd w/wo contrast 10/9/2021 showed again hepatic steatosis, normal liver contour, and was otherwise unremarkable, with normal spleen and without lymphadenopathy. CXR was negative (7/2021).\par \par \par \par # Moderately elevated liver enzymes in hepatocellular pattern, with normal cholestatic parameters, with intact synthetic function, chronic with acute rise / worsening \par # NAFLD/SULTANA?\par # PreDM\par # Overweight\par # Dyslipidemia (low HDL)\par # ALD? 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 \par - Patient has metabolic risk factors, overweight, and has worsening prediabetes, furthermore US abd and MRI abd suggested hepatic steatosis, thus NAFLD/SULTANA is high in differential. \par ---Discussed natural Hx of NAFLD/SULTANA, potential progression to cirrhosis, and increased risk of cancer. \par ---Discussed diet in details, exercise, weight loss. \par ---He has not had Fibroscan yet, fib 4 score 0.82, F0-1.\par ---Patient has been counseled on life style interventions, including but not limited to: weight loss (3-5% loss of body weight might improve fat in the liver, while 7-10% needed for potential improvement of other components, including inflammation and scarring of the liver, called fibrosis); healthy diet, avoiding added sugars, sodas, avoiding saturated fats, limiting sodium, avoiding alcohol; and on the importance of regular exercise (> 150 min/week moderate intensity aerobic exercise with at least 2x/week muscle strengthening or exercise as tolerated). \par ---Offered nutritionist, but patient deferred this time. \par ---Advised on complete alcohol abstinence, it possible contributes to elevated liver enzymes, although pattern not the typical AST>ALT.\par \par - Ferritin elevated, but normal transferrin saturation, and neg HFE mutation, thus hemochromatosis unlikely, and ferritin rather related to NAFLD.ALD\par \par - Elevated IgG4 concerning, especially since he has some systemic symptoms, also abdominal pain. Patient has contacted to rheumatology already, pending visit. Discussed that tissue diagnosis needed to prove or rule out. Discussed liver biopsy in details. Given recent highly positive Peth, patient will have repeat blood work at the end of May after 1 month complete abstinence. Discussed that unless liver enzymes normalizing, would still recommend liver biopsy to r/o AI / IgG 4 component. Ordered repeat US abd b/o last imaging > 6 months. \par \par #Relative lymphocytosis\par #Microcytosis\par - Advised again to schedule appointment with hematology. Patient prefer outside of [facility].\par - Will get Hb electrophoresis meantime. \par \par \par RTC 1 month, but patient to call office to discuss results and do blood work at the end of May after complete abstinence

## 2022-05-14 NOTE — PHYSICAL EXAM
[Non-Tender] : non-tender [General Appearance - Alert] : alert [General Appearance - In No Acute Distress] : in no acute distress [General Appearance - Well Nourished] : well nourished [General Appearance - Well Developed] : well developed [General Appearance - Well-Appearing] : healthy appearing [Sclera] : the sclera and conjunctiva were normal [Oropharynx] : the oropharynx was normal [Neck Appearance] : the appearance of the neck was normal [] : no respiratory distress [Respiration, Rhythm And Depth] : normal respiratory rhythm and effort [Exaggerated Use Of Accessory Muscles For Inspiration] : no accessory muscle use [Auscultation Breath Sounds / Voice Sounds] : lungs were clear to auscultation bilaterally [Heart Rate And Rhythm] : heart rate was normal and rhythm regular [Heart Sounds] : normal S1 and S2 [Edema] : there was no peripheral edema [Cervical Lymph Nodes Enlarged Posterior Bilaterally] : posterior cervical [Cervical Lymph Nodes Enlarged Anterior Bilaterally] : anterior cervical [Supraclavicular Lymph Nodes Enlarged Bilaterally] : supraclavicular [Axillary Lymph Nodes Enlarged Bilaterally] : axillary [Inguinal Lymph Nodes Enlarged Bilaterally] : inguinal [No CVA Tenderness] : no ~M costovertebral angle tenderness [No Spinal Tenderness] : no spinal tenderness [Abnormal Walk] : normal gait [Skin Color & Pigmentation] : normal skin color and pigmentation [Skin Turgor] : normal skin turgor [Oriented To Time, Place, And Person] : oriented to person, place, and time [Affect] : the affect was normal [Impaired Insight] : insight and judgment were intact [Mood] : the mood was normal [Rounded] : rounded [Normal] : normal [Soft, Nontender] : the abdomen was soft and nontender [No Mass] : no masses were palpated [None] : no CVA tenderness [Scleral Icterus] : No Scleral Icterus [Hepatojugular Reflux] : patient did not have a sustained hepatojugular reflux [Spider Angioma] : No spider angioma(s) were observed [Liver Palpable ___ Finger Breadths Below Costal Margin] : was not palpable below costal margin [Abdominal  Ascites] : no ascites [Asterixis] : no asterixis observed [Jaundice] : No jaundice [Palmar Erythema] : no Palmar Erythema [Depression] : no depression [FreeTextEntry1] : Grossly intact [Dilated Collat. Veins] : no collateral vein dilation [Striae] : no striae [Firm] : not firm [Rigid] : not rigid [Rebound] : no rebound [Guarding] : no guarding [Delatorre's] : a negative Delatorre's sign [Liver Tender To Palpation] : not tender

## 2022-05-14 NOTE — REVIEW OF SYSTEMS
[Itching] : itching [Negative] : Respiratory [Vomiting] : no vomiting [Constipation] : no constipation [Diarrhea] : no diarrhea [Heartburn] : no heartburn [Melena] : no melena [Dysuria] : no dysuria [Confused] : no confusion [Easy Bleeding] : no tendency for easy bleeding [Easy Bruising] : no tendency for easy bruising [FreeTextEntry7] : Epigastric / LUQ pain in evenings, reports that less frequent since beginning of May  [FreeTextEntry9] : Chronic lower back pain, and R shoulder pain since rotator cuff surgery [de-identified] : Reports dry skin [de-identified] : L breast lump

## 2022-05-14 NOTE — HISTORY OF PRESENT ILLNESS
[FreeTextEntry1] : 37 yo overweight (BMI 26) Male with Hx of prediabetes (HBA1c 5.7->6.3), chronic low back and R shoulder pain (s/p rotator cuff surgery), infertility, noted URI symptoms in the beginning of June 2021, and has been treated with antibiotics, including Amoxicillin vs. Augmentin, for extended period (3 weeks?) and has c/o prolonged night sweats, itching and mostly epigastric pain at night after. \par He was seen for initial visit at [facility] 7/28/21 b/o elevated liver enzymes. \par He was noted to have mildly elevated liver enzymes in hepatocellular pattern in 2/2021 and been worsening / quadrupled by 7/2021 (AST 36->109, ALT 75->330), with normal cholestatic parameters and synthetic function.  He was not aware of liver disease previously. He reported more sedentary lifestyle since the start of the pandemic, working from home, in IT sector. \par \par US abd 8/3/21 showed mild hepatomegaly, hepatic steatosis, otherwise was unremarkable. MRI abd w/wo contrast 10/9/2021 showed again hepatic steatosis, normal liver contour, and was otherwise unremarkable, with normal spleen and without lymphadenopathy. CXR was negative (7/2021).\par \par Liver workup showed high IgG4 (3x ULN) and IgE, slightly elevated C3/C4 levels. SKYLA neg, SMA neg, LKM neg, AMA neg. It also showed elevated ferritin (525), but with normal transferrin saturation (25%) and negative HFE mutation. \par \par Rest of liver workup: Hep C ab neg, Hep A neg, not immune, Hep B immune, not exposed, HIV neg, Hep E IgM/IgG neg, tTG IgA neg, CMV past infection / PCR neg, EBV past infection /  DNA neg, VZV PCR neg, HSV1/2 PCR neg, Quantiferon neg, ceruloplasmin 29, A1AT nl, Borrelia IgG/IgM neg, COVID PCR neg, CPK WNL, TSH WNL, leptospirosis neg. \par \par He was also noted relative lymphocytosis and was referred to hematology. \par \par Notably, he has been / being also evaluated for L breast "nodule" by PCP\par \par He is here for follow up, accompanied by wife. He tried life style changes, with reported weight loss initially but than gained back lately. His HBA1c worsened, 6.3 and he reports eating sweets. His Peth was positive, 259. \par He was not able to see hematology (relative lymphocytosis, nightsweats) and rheumatology (elevated IgG4) yet due to his busy work schedule. \par He still reports occasional, mostly L sided abdominal pain, not related to meals, and reports less frequent in the last month, since he reports abstinence. Night sweats improved.\par  \par \par

## 2022-05-18 ENCOUNTER — APPOINTMENT (OUTPATIENT)
Dept: ULTRASOUND IMAGING | Facility: CLINIC | Age: 37
End: 2022-05-18
Payer: COMMERCIAL

## 2022-05-18 ENCOUNTER — OUTPATIENT (OUTPATIENT)
Dept: OUTPATIENT SERVICES | Facility: HOSPITAL | Age: 37
LOS: 1 days | End: 2022-05-18
Payer: COMMERCIAL

## 2022-05-18 DIAGNOSIS — R79.89 OTHER SPECIFIED ABNORMAL FINDINGS OF BLOOD CHEMISTRY: ICD-10-CM

## 2022-05-18 PROCEDURE — 76700 US EXAM ABDOM COMPLETE: CPT | Mod: 26

## 2022-05-18 PROCEDURE — 76700 US EXAM ABDOM COMPLETE: CPT

## 2022-05-19 ENCOUNTER — TRANSCRIPTION ENCOUNTER (OUTPATIENT)
Age: 37
End: 2022-05-19

## 2022-06-01 LAB
ACE BLD-CCNC: 44 U/L
ALBUMIN MFR SERPL ELPH: 57.8 %
ALBUMIN SERPL-MCNC: 4.8 G/DL
ALBUMIN/GLOB SERPL: 1.4 RATIO
ALDOLASE SERPL-CCNC: 9.5 U/L
ALPHA1 GLOB MFR SERPL ELPH: 3.6 %
ALPHA1 GLOB SERPL ELPH-MCNC: 0.3 G/DL
ALPHA2 GLOB MFR SERPL ELPH: 10.5 %
ALPHA2 GLOB SERPL ELPH-MCNC: 0.9 G/DL
B-GLOBULIN MFR SERPL ELPH: 15.3 %
B-GLOBULIN SERPL ELPH-MCNC: 1.3 G/DL
C3 SERPL-MCNC: 170 MG/DL
C4 SERPL-MCNC: 44 MG/DL
CK SERPL-CCNC: 163 U/L
CRP SERPL-MCNC: 6 MG/L
DEPRECATED KAPPA LC FREE/LAMBDA SER: 1.23 RATIO
DSDNA AB SER-ACNC: <12 IU/ML
ENA RNP AB SER IA-ACNC: <0.2 AL
ENA SM AB SER IA-ACNC: <0.2 AL
ENA SS-A AB SER IA-ACNC: <0.2 AL
ENA SS-B AB SER IA-ACNC: <0.2 AL
ERYTHROCYTE [SEDIMENTATION RATE] IN BLOOD BY WESTERGREN METHOD: 11 MM/HR
GAMMA GLOB FLD ELPH-MCNC: 1.1 G/DL
GAMMA GLOB MFR SERPL ELPH: 12.8 %
IGA SER QL IEP: 246 MG/DL
IGG SER QL IEP: 1200 MG/DL
IGG SUBSET TOTAL IGG: 1256 MG/DL
IGG1 SER-MCNC: 479 MG/DL
IGG2 SER-MCNC: 308 MG/DL
IGG3 SER-MCNC: 59 MG/DL
IGG4 SER-MCNC: 272 MG/DL
IGM SER QL IEP: 49 MG/DL
INTERPRETATION SERPL IEP-IMP: NORMAL
KAPPA LC CSF-MCNC: 1.62 MG/DL
KAPPA LC SERPL-MCNC: 1.99 MG/DL
M PROTEIN SPEC IFE-MCNC: NORMAL
MYOGLOBIN SERPL-MCNC: 30 NG/ML
PROT SERPL-MCNC: 8.3 G/DL
PROT SERPL-MCNC: 8.3 G/DL

## 2022-06-07 ENCOUNTER — APPOINTMENT (OUTPATIENT)
Dept: UROLOGY | Facility: CLINIC | Age: 37
End: 2022-06-07

## 2022-09-06 ENCOUNTER — APPOINTMENT (OUTPATIENT)
Dept: UROLOGY | Facility: CLINIC | Age: 37
End: 2022-09-06

## 2022-09-23 ENCOUNTER — APPOINTMENT (OUTPATIENT)
Dept: ORTHOPEDIC SURGERY | Facility: CLINIC | Age: 37
End: 2022-09-23

## 2022-09-23 DIAGNOSIS — M54.12 RADICULOPATHY, CERVICAL REGION: ICD-10-CM

## 2022-09-23 PROCEDURE — 72040 X-RAY EXAM NECK SPINE 2-3 VW: CPT

## 2022-09-23 PROCEDURE — 99203 OFFICE O/P NEW LOW 30 MIN: CPT

## 2022-09-23 PROCEDURE — 73030 X-RAY EXAM OF SHOULDER: CPT | Mod: RT

## 2022-09-23 RX ORDER — METHYLPREDNISOLONE 4 MG/1
4 TABLET ORAL
Qty: 1 | Refills: 0 | Status: ACTIVE | COMMUNITY
Start: 2022-09-23 | End: 1900-01-01

## 2022-09-23 NOTE — IMAGING
[de-identified] : Strength:\par Deltoid: 5/5 b/l\par Biceps: 5/5 b/l\par Tricep: 5/5 b/l\par Wrist flexor/extensors: 5/5 b/l\par Interosseous: 5/5 b/l\par Grasp: 5/5 b/l\par \par Sensation: Grossly Intact\par Hoffmans: Negative\par  \par \par ROM:\par \par IR T10\par ER 80\par \par \par XRAYS:\par C spine: minimal spondylolysis no malalignment \par Shoulder: no fractures or dislocations

## 2022-09-23 NOTE — HISTORY OF PRESENT ILLNESS
[Sudden] : sudden [5] : 5 [Dull/Aching] : dull/aching [Localized] : localized [Sharp] : sharp [Stabbing] : stabbing [Constant] : constant [Household chores] : household chores [Leisure] : leisure [Work] : work [Sleep] : sleep [Full time] : Work status: full time [de-identified] : 37 y/o M presenting with R shoulder/neck pain X 1 week. Was doing yard work and felt the pain. Has tried diclofenac cream, lidocaine patches with no relieve. History of torn Rotator cuff, states this pain feels different. Reporting numbness in the RLE. \par \par History R Rotator Cuff Repair-2018 [] : no [FreeTextEntry1] : Right Shoulder  [FreeTextEntry5] : Julio is a 36 year old male who is here today for his Right Shoulder. \par About 7 days ago he was lifting something heavy he felt as if he tore something in his shoulder.\balta Had some swelling and has pain in the shoulder.\par Able to lift shoulder but he feels a stinging pain in the shoulder blade.\par  Pain is always around no matter if he uses the arm and or just sitting and laying down.\par Went to a chiropractor yesterday to see if that would help.  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

## 2022-09-23 NOTE — ASSESSMENT
[FreeTextEntry1] : 37 y/o M with cervical radic \par \par -MDP given\par -Physical Therapy rx provided\par -If no relieve in 4-6 weeks, will follow up and get MRI

## 2022-11-06 LAB
ALBUMIN SERPL ELPH-MCNC: 4.7 G/DL
ALP BLD-CCNC: 76 U/L
ALT SERPL-CCNC: 179 U/L
ANION GAP SERPL CALC-SCNC: 15 MMOL/L
APTT BLD: 36.6 SEC
AST SERPL-CCNC: 59 U/L
BASOPHILS # BLD AUTO: 0.06 K/UL
BASOPHILS NFR BLD AUTO: 0.8 %
BILIRUB DIRECT SERPL-MCNC: 0.2 MG/DL
BILIRUB INDIRECT SERPL-MCNC: 0.7 MG/DL
BILIRUB SERPL-MCNC: 0.9 MG/DL
BUN SERPL-MCNC: 10 MG/DL
CALCIUM SERPL-MCNC: 10 MG/DL
CHLORIDE SERPL-SCNC: 104 MMOL/L
CK SERPL-CCNC: 93 U/L
CO2 SERPL-SCNC: 23 MMOL/L
CREAT SERPL-MCNC: 1.05 MG/DL
EGFR: 95 ML/MIN/1.73M2
EOSINOPHIL # BLD AUTO: 0.21 K/UL
EOSINOPHIL NFR BLD AUTO: 2.7 %
ESTIMATED AVERAGE GLUCOSE: 131 MG/DL
FERRITIN SERPL-MCNC: 266 NG/ML
GLUCOSE SERPL-MCNC: 97 MG/DL
HBA1C MFR BLD HPLC: 6.2 %
HCT VFR BLD CALC: 44.4 %
HGB BLD-MCNC: 13.2 G/DL
IMM GRANULOCYTES NFR BLD AUTO: 0.1 %
INR PPP: 1.04 RATIO
IRON SATN MFR SERPL: 24 %
IRON SERPL-MCNC: 97 UG/DL
LYMPHOCYTES # BLD AUTO: 4.18 K/UL
LYMPHOCYTES NFR BLD AUTO: 53.9 %
MAN DIFF?: NORMAL
MCHC RBC-ENTMCNC: 21.3 PG
MCHC RBC-ENTMCNC: 29.7 GM/DL
MCV RBC AUTO: 71.7 FL
MITOCHONDRIA AB SER IF-ACNC: NORMAL
MONOCYTES # BLD AUTO: 0.53 K/UL
MONOCYTES NFR BLD AUTO: 6.8 %
NEUTROPHILS # BLD AUTO: 2.76 K/UL
NEUTROPHILS NFR BLD AUTO: 35.7 %
PLATELET # BLD AUTO: 217 K/UL
POTASSIUM SERPL-SCNC: 4.4 MMOL/L
PROT SERPL-MCNC: 7.5 G/DL
PT BLD: 12.2 SEC
RBC # BLD: 6.19 M/UL
RBC # FLD: 17.3 %
SMOOTH MUSCLE AB SER QL IF: NORMAL
SODIUM SERPL-SCNC: 142 MMOL/L
TIBC SERPL-MCNC: 400 UG/DL
UIBC SERPL-MCNC: 303 UG/DL
WBC # FLD AUTO: 7.75 K/UL

## 2022-11-09 LAB
ANA SER IF-ACNC: NEGATIVE
DEPRECATED KAPPA LC FREE/LAMBDA SER: 1.31 RATIO
IGA SER QL IEP: 202 MG/DL
IGG SER QL IEP: 1166 MG/DL
IGG4 SER-MCNC: 278 MG/DL
IGM SER QL IEP: 44 MG/DL
KAPPA LC CSF-MCNC: 1.63 MG/DL
KAPPA LC SERPL-MCNC: 2.14 MG/DL
LKM AB SER QL IF: <20.1 UNITS
SOLUBLE LIVER IGG SER IA-ACNC: 1

## 2022-11-14 LAB — PHOSPHATIDYETHANOL (PETH), WHOLE BLOOD: 165 NG/ML

## 2022-11-20 ENCOUNTER — FORM ENCOUNTER (OUTPATIENT)
Age: 37
End: 2022-11-20

## 2023-01-13 ENCOUNTER — APPOINTMENT (OUTPATIENT)
Dept: SLEEP CENTER | Facility: CLINIC | Age: 38
End: 2023-01-13
Payer: COMMERCIAL

## 2023-01-13 ENCOUNTER — OUTPATIENT (OUTPATIENT)
Dept: OUTPATIENT SERVICES | Facility: HOSPITAL | Age: 38
LOS: 1 days | End: 2023-01-13
Payer: COMMERCIAL

## 2023-01-13 PROCEDURE — 95800 SLP STDY UNATTENDED: CPT

## 2023-01-13 PROCEDURE — 95800 SLP STDY UNATTENDED: CPT | Mod: 26

## 2023-02-17 ENCOUNTER — OUTPATIENT (OUTPATIENT)
Dept: OUTPATIENT SERVICES | Facility: HOSPITAL | Age: 38
LOS: 1 days | End: 2023-02-17
Payer: COMMERCIAL

## 2023-02-17 ENCOUNTER — APPOINTMENT (OUTPATIENT)
Dept: RADIOLOGY | Facility: CLINIC | Age: 38
End: 2023-02-17
Payer: COMMERCIAL

## 2023-02-17 ENCOUNTER — APPOINTMENT (OUTPATIENT)
Dept: ULTRASOUND IMAGING | Facility: CLINIC | Age: 38
End: 2023-02-17
Payer: COMMERCIAL

## 2023-02-17 DIAGNOSIS — G47.33 OBSTRUCTIVE SLEEP APNEA (ADULT) (PEDIATRIC): ICD-10-CM

## 2023-02-17 DIAGNOSIS — Z00.00 ENCOUNTER FOR GENERAL ADULT MEDICAL EXAMINATION WITHOUT ABNORMAL FINDINGS: ICD-10-CM

## 2023-02-17 PROCEDURE — 77074 RADEX OSSEOUS SURVEY LMTD: CPT | Mod: 26

## 2023-02-17 PROCEDURE — 76700 US EXAM ABDOM COMPLETE: CPT

## 2023-02-17 PROCEDURE — 76700 US EXAM ABDOM COMPLETE: CPT | Mod: 26

## 2023-02-17 PROCEDURE — 77074 RADEX OSSEOUS SURVEY LMTD: CPT

## 2023-02-23 ENCOUNTER — NON-APPOINTMENT (OUTPATIENT)
Age: 38
End: 2023-02-23

## 2023-02-23 ENCOUNTER — APPOINTMENT (OUTPATIENT)
Dept: CARDIOLOGY | Facility: CLINIC | Age: 38
End: 2023-02-23
Payer: COMMERCIAL

## 2023-02-23 VITALS
DIASTOLIC BLOOD PRESSURE: 78 MMHG | TEMPERATURE: 98 F | WEIGHT: 164 LBS | BODY MASS INDEX: 24.86 KG/M2 | OXYGEN SATURATION: 97 % | HEART RATE: 82 BPM | HEIGHT: 68 IN | SYSTOLIC BLOOD PRESSURE: 114 MMHG

## 2023-02-23 VITALS — BODY MASS INDEX: 24.63 KG/M2 | WEIGHT: 162 LBS

## 2023-02-23 DIAGNOSIS — Z71.89 OTHER SPECIFIED COUNSELING: ICD-10-CM

## 2023-02-23 PROCEDURE — 99203 OFFICE O/P NEW LOW 30 MIN: CPT

## 2023-02-23 PROCEDURE — 93000 ELECTROCARDIOGRAM COMPLETE: CPT

## 2023-02-23 NOTE — DISCUSSION/SUMMARY
[FreeTextEntry1] : 37M for cardiac eval\par \par Prior EST normal\par BP, lipids good\par No symptoms\par Elevated AST/ALT of unclear etiology- check echo to ensure no cardiac cause, rule out pulmonary HTN\par \par RV 1Y\par

## 2023-02-23 NOTE — HISTORY OF PRESENT ILLNESS
[FreeTextEntry1] : 37M who presents for cardiac follow up\par \par Last seen in 2018\par Feeling well overall\par Patient denies chest pain, shortness of breath, palpitations, dizziness, lightheadedness, syncope.\par Elevated AST/ALT, currently being worked up, possible SULTANA\par \par Nonsmoker. Denies family history of cardiac disease. Works in IT for [employer].\par \par ECG: SR, no ST-T wave changes\par \par

## 2023-03-14 ENCOUNTER — NON-APPOINTMENT (OUTPATIENT)
Age: 38
End: 2023-03-14

## 2023-03-14 ENCOUNTER — APPOINTMENT (OUTPATIENT)
Dept: OPHTHALMOLOGY | Facility: CLINIC | Age: 38
End: 2023-03-14
Payer: COMMERCIAL

## 2023-03-14 PROCEDURE — 92004 COMPRE OPH EXAM NEW PT 1/>: CPT

## 2023-03-14 PROCEDURE — 92025 CPTRIZED CORNEAL TOPOGRAPHY: CPT

## 2023-03-16 ENCOUNTER — APPOINTMENT (OUTPATIENT)
Dept: INTERNAL MEDICINE | Facility: CLINIC | Age: 38
End: 2023-03-16
Payer: COMMERCIAL

## 2023-03-16 PROCEDURE — 93306 TTE W/DOPPLER COMPLETE: CPT

## 2023-04-13 ENCOUNTER — LABORATORY RESULT (OUTPATIENT)
Age: 38
End: 2023-04-13

## 2023-05-23 ENCOUNTER — APPOINTMENT (OUTPATIENT)
Dept: INTERNAL MEDICINE | Facility: CLINIC | Age: 38
End: 2023-05-23

## 2023-06-23 ENCOUNTER — OUTPATIENT (OUTPATIENT)
Dept: OUTPATIENT SERVICES | Facility: HOSPITAL | Age: 38
LOS: 1 days | End: 2023-06-23

## 2023-06-23 VITALS
HEIGHT: 68 IN | OXYGEN SATURATION: 98 % | SYSTOLIC BLOOD PRESSURE: 142 MMHG | WEIGHT: 171.08 LBS | HEART RATE: 78 BPM | RESPIRATION RATE: 16 BRPM | TEMPERATURE: 98 F | DIASTOLIC BLOOD PRESSURE: 86 MMHG

## 2023-06-23 DIAGNOSIS — M26.03 MANDIBULAR HYPERPLASIA: ICD-10-CM

## 2023-06-23 DIAGNOSIS — M26.02 MAXILLARY HYPOPLASIA: ICD-10-CM

## 2023-06-23 DIAGNOSIS — Z98.890 OTHER SPECIFIED POSTPROCEDURAL STATES: Chronic | ICD-10-CM

## 2023-06-23 LAB
BLD GP AB SCN SERPL QL: NEGATIVE — SIGNIFICANT CHANGE UP
HCT VFR BLD CALC: 45.3 % — SIGNIFICANT CHANGE UP (ref 39–50)
HGB BLD-MCNC: 13.7 G/DL — SIGNIFICANT CHANGE UP (ref 13–17)
MCHC RBC-ENTMCNC: 21.3 PG — LOW (ref 27–34)
MCHC RBC-ENTMCNC: 30.2 GM/DL — LOW (ref 32–36)
MCV RBC AUTO: 70.5 FL — LOW (ref 80–100)
NRBC # BLD: 0 /100 WBCS — SIGNIFICANT CHANGE UP (ref 0–0)
NRBC # FLD: 0 K/UL — SIGNIFICANT CHANGE UP (ref 0–0)
PLATELET # BLD AUTO: 216 K/UL — SIGNIFICANT CHANGE UP (ref 150–400)
RBC # BLD: 6.43 M/UL — HIGH (ref 4.2–5.8)
RBC # FLD: 17.6 % — HIGH (ref 10.3–14.5)
RH IG SCN BLD-IMP: POSITIVE — SIGNIFICANT CHANGE UP
WBC # BLD: 8.16 K/UL — SIGNIFICANT CHANGE UP (ref 3.8–10.5)
WBC # FLD AUTO: 8.16 K/UL — SIGNIFICANT CHANGE UP (ref 3.8–10.5)

## 2023-06-23 RX ORDER — SODIUM CHLORIDE 9 MG/ML
1000 INJECTION, SOLUTION INTRAVENOUS
Refills: 0 | Status: DISCONTINUED | OUTPATIENT
Start: 2023-07-06 | End: 2023-07-07

## 2023-06-23 NOTE — H&P PST ADULT - PROBLEM SELECTOR PLAN 1
Pt scheduled for surgery and preop instructions including instructions for taking Famotidine and for Chlorhexidine use in showering on the day of surgery, given verbally and with use of  written materials, and patient confirming understanding of such instructions using  teach back method.

## 2023-06-23 NOTE — H&P PST ADULT - HISTORY OF PRESENT ILLNESS
Pt is a 37 yr old male scheduled for Maxillary Lefort 1 Osteotomy B/L Sagittal Split Osteotomies with Dr Yuen tentatively 7/6/23 - pt hx asymmetry of jaw - denies pain but states he is aware of need for surgery - pt has Invisalign placed 2.5 yrs ago - pt is occasional long term smoker

## 2023-07-05 ENCOUNTER — TRANSCRIPTION ENCOUNTER (OUTPATIENT)
Age: 38
End: 2023-07-05

## 2023-07-06 ENCOUNTER — INPATIENT (INPATIENT)
Facility: HOSPITAL | Age: 38
LOS: 1 days | Discharge: HOME | End: 2023-07-08
Attending: ORAL & MAXILLOFACIAL SURGERY | Admitting: ORAL & MAXILLOFACIAL SURGERY
Payer: COMMERCIAL

## 2023-07-06 VITALS
HEART RATE: 74 BPM | HEIGHT: 68 IN | DIASTOLIC BLOOD PRESSURE: 69 MMHG | OXYGEN SATURATION: 98 % | WEIGHT: 171.08 LBS | RESPIRATION RATE: 18 BRPM | TEMPERATURE: 98 F | SYSTOLIC BLOOD PRESSURE: 112 MMHG

## 2023-07-06 DIAGNOSIS — Z98.890 OTHER SPECIFIED POSTPROCEDURAL STATES: Chronic | ICD-10-CM

## 2023-07-06 DIAGNOSIS — M26.03 MANDIBULAR HYPERPLASIA: ICD-10-CM

## 2023-07-06 LAB
BLOOD GAS ARTERIAL - LYTES,HGB,ICA,LACT RESULT: SIGNIFICANT CHANGE UP
GAS PNL BLDA: SIGNIFICANT CHANGE UP

## 2023-07-06 PROCEDURE — 70250 X-RAY EXAM OF SKULL: CPT | Mod: 26

## 2023-07-06 DEVICE — PLATE CVD 6H 4MM: Type: IMPLANTABLE DEVICE | Status: FUNCTIONAL

## 2023-07-06 DEVICE — SCREW AXS SELF TAP 1.7X4MM MUST ORDER IN MULTIPLES OF 5: Type: IMPLANTABLE DEVICE | Status: FUNCTIONAL

## 2023-07-06 DEVICE — PLATE CVD 6H 8MM: Type: IMPLANTABLE DEVICE | Status: FUNCTIONAL

## 2023-07-06 DEVICE — SURGICEL 2 X 14": Type: IMPLANTABLE DEVICE | Status: FUNCTIONAL

## 2023-07-06 DEVICE — IMPLANTABLE DEVICE: Type: IMPLANTABLE DEVICE | Status: FUNCTIONAL

## 2023-07-06 DEVICE — SCREW SLF DRILLING 8MM MMF MUST ORDER IN MULTIPLES OF 4: Type: IMPLANTABLE DEVICE | Status: FUNCTIONAL

## 2023-07-06 DEVICE — PLATE L 6H 100 DEG 8MM LT: Type: IMPLANTABLE DEVICE | Status: FUNCTIONAL

## 2023-07-06 DEVICE — BUNDLE VSP ORTHOG GUIDES NYLON: Type: IMPLANTABLE DEVICE | Status: FUNCTIONAL

## 2023-07-06 DEVICE — K-WIRE S&N DOUBLE TROCAR 0.045" X 4": Type: IMPLANTABLE DEVICE | Status: FUNCTIONAL

## 2023-07-06 DEVICE — SCREW AXS SELF TAP 1.7X8MM: Type: IMPLANTABLE DEVICE | Status: FUNCTIONAL

## 2023-07-06 DEVICE — IMP VSP MODELING: Type: IMPLANTABLE DEVICE | Status: FUNCTIONAL

## 2023-07-06 DEVICE — SCREW AXS SELF TAP 1.7X5MM MUST ORDER IN MULTIPLES OF 5: Type: IMPLANTABLE DEVICE | Status: FUNCTIONAL

## 2023-07-06 DEVICE — PLATE L 6H 100 DEG 8MM RT: Type: IMPLANTABLE DEVICE | Status: FUNCTIONAL

## 2023-07-06 DEVICE — SCREW SELF TP CROSSPIN MP 2X4MM MUST ORDER IN MULTIPLES OF 5: Type: IMPLANTABLE DEVICE | Status: FUNCTIONAL

## 2023-07-06 DEVICE — FLOSEAL WITH RECOTHROM THROMBIN 5ML: Type: IMPLANTABLE DEVICE | Status: FUNCTIONAL

## 2023-07-06 RX ORDER — PENICILLIN V POTASSIUM 250 MG
2 TABLET ORAL EVERY 4 HOURS
Refills: 0 | Status: DISCONTINUED | OUTPATIENT
Start: 2023-07-06 | End: 2023-07-08

## 2023-07-06 RX ORDER — ACETAMINOPHEN 500 MG
2 TABLET ORAL
Refills: 0 | DISCHARGE

## 2023-07-06 RX ORDER — OXYMETAZOLINE HYDROCHLORIDE 0.5 MG/ML
2 SPRAY NASAL
Refills: 0 | Status: DISCONTINUED | OUTPATIENT
Start: 2023-07-06 | End: 2023-07-06

## 2023-07-06 RX ORDER — OXYMETAZOLINE HYDROCHLORIDE 0.5 MG/ML
1 SPRAY NASAL
Refills: 0 | Status: DISCONTINUED | OUTPATIENT
Start: 2023-07-06 | End: 2023-07-08

## 2023-07-06 RX ORDER — ONABOTULINUMTOXINA 100 UNIT
100 VIAL (EA) INJECTION ONCE
Refills: 0 | Status: DISCONTINUED | OUTPATIENT
Start: 2023-07-06 | End: 2023-07-08

## 2023-07-06 RX ORDER — SODIUM CHLORIDE 9 MG/ML
1000 INJECTION, SOLUTION INTRAVENOUS
Refills: 0 | Status: DISCONTINUED | OUTPATIENT
Start: 2023-07-06 | End: 2023-07-07

## 2023-07-06 RX ORDER — IBUPROFEN 200 MG
600 TABLET ORAL EVERY 6 HOURS
Refills: 0 | Status: DISCONTINUED | OUTPATIENT
Start: 2023-07-06 | End: 2023-07-08

## 2023-07-06 RX ORDER — MORPHINE SULFATE 50 MG/1
2 CAPSULE, EXTENDED RELEASE ORAL ONCE
Refills: 0 | Status: DISCONTINUED | OUTPATIENT
Start: 2023-07-06 | End: 2023-07-08

## 2023-07-06 RX ORDER — HYDROMORPHONE HYDROCHLORIDE 2 MG/ML
0.5 INJECTION INTRAMUSCULAR; INTRAVENOUS; SUBCUTANEOUS
Refills: 0 | Status: DISCONTINUED | OUTPATIENT
Start: 2023-07-06 | End: 2023-07-06

## 2023-07-06 RX ORDER — FLUTICASONE PROPIONATE 50 MCG
1 SPRAY, SUSPENSION NASAL
Refills: 0 | Status: DISCONTINUED | OUTPATIENT
Start: 2023-07-06 | End: 2023-07-08

## 2023-07-06 RX ORDER — OXYCODONE HYDROCHLORIDE 5 MG/1
5 TABLET ORAL EVERY 4 HOURS
Refills: 0 | Status: DISCONTINUED | OUTPATIENT
Start: 2023-07-06 | End: 2023-07-08

## 2023-07-06 RX ORDER — HYDROMORPHONE HYDROCHLORIDE 2 MG/ML
1 INJECTION INTRAMUSCULAR; INTRAVENOUS; SUBCUTANEOUS
Refills: 0 | Status: DISCONTINUED | OUTPATIENT
Start: 2023-07-06 | End: 2023-07-06

## 2023-07-06 RX ORDER — ACETAMINOPHEN 500 MG
650 TABLET ORAL EVERY 6 HOURS
Refills: 0 | Status: DISCONTINUED | OUTPATIENT
Start: 2023-07-06 | End: 2023-07-08

## 2023-07-06 RX ORDER — ONDANSETRON 8 MG/1
4 TABLET, FILM COATED ORAL EVERY 8 HOURS
Refills: 0 | Status: DISCONTINUED | OUTPATIENT
Start: 2023-07-06 | End: 2023-07-06

## 2023-07-06 RX ORDER — METOCLOPRAMIDE HCL 10 MG
10 TABLET ORAL ONCE
Refills: 0 | Status: DISCONTINUED | OUTPATIENT
Start: 2023-07-06 | End: 2023-07-06

## 2023-07-06 RX ORDER — CHLORHEXIDINE GLUCONATE 213 G/1000ML
15 SOLUTION TOPICAL
Refills: 0 | Status: DISCONTINUED | OUTPATIENT
Start: 2023-07-06 | End: 2023-07-08

## 2023-07-06 RX ORDER — OXYCODONE HYDROCHLORIDE 5 MG/1
10 TABLET ORAL EVERY 4 HOURS
Refills: 0 | Status: DISCONTINUED | OUTPATIENT
Start: 2023-07-06 | End: 2023-07-08

## 2023-07-06 RX ORDER — METOCLOPRAMIDE HCL 10 MG
10 TABLET ORAL ONCE
Refills: 0 | Status: DISCONTINUED | OUTPATIENT
Start: 2023-07-06 | End: 2023-07-08

## 2023-07-06 RX ORDER — SODIUM CHLORIDE 0.65 %
1 AEROSOL, SPRAY (ML) NASAL ONCE
Refills: 0 | Status: DISCONTINUED | OUTPATIENT
Start: 2023-07-06 | End: 2023-07-06

## 2023-07-06 RX ORDER — ONDANSETRON 8 MG/1
4 TABLET, FILM COATED ORAL EVERY 8 HOURS
Refills: 0 | Status: DISCONTINUED | OUTPATIENT
Start: 2023-07-06 | End: 2023-07-08

## 2023-07-06 RX ORDER — FLUTICASONE PROPIONATE 50 MCG
1 SPRAY, SUSPENSION NASAL ONCE
Refills: 0 | Status: DISCONTINUED | OUTPATIENT
Start: 2023-07-06 | End: 2023-07-06

## 2023-07-06 RX ORDER — SODIUM CHLORIDE 0.65 %
1 AEROSOL, SPRAY (ML) NASAL
Refills: 0 | Status: DISCONTINUED | OUTPATIENT
Start: 2023-07-06 | End: 2023-07-08

## 2023-07-06 RX ADMIN — Medication 600 MILLIGRAM(S): at 22:26

## 2023-07-06 RX ADMIN — Medication 100 MILLION UNIT(S): at 21:42

## 2023-07-06 RX ADMIN — HYDROMORPHONE HYDROCHLORIDE 0.5 MILLIGRAM(S): 2 INJECTION INTRAMUSCULAR; INTRAVENOUS; SUBCUTANEOUS at 21:55

## 2023-07-06 RX ADMIN — HYDROMORPHONE HYDROCHLORIDE 0.5 MILLIGRAM(S): 2 INJECTION INTRAMUSCULAR; INTRAVENOUS; SUBCUTANEOUS at 21:34

## 2023-07-06 RX ADMIN — HYDROMORPHONE HYDROCHLORIDE 0.5 MILLIGRAM(S): 2 INJECTION INTRAMUSCULAR; INTRAVENOUS; SUBCUTANEOUS at 21:05

## 2023-07-06 RX ADMIN — Medication 600 MILLIGRAM(S): at 21:41

## 2023-07-06 RX ADMIN — HYDROMORPHONE HYDROCHLORIDE 0.5 MILLIGRAM(S): 2 INJECTION INTRAMUSCULAR; INTRAVENOUS; SUBCUTANEOUS at 21:29

## 2023-07-06 RX ADMIN — SODIUM CHLORIDE 107 MILLILITER(S): 9 INJECTION, SOLUTION INTRAVENOUS at 22:54

## 2023-07-06 NOTE — CHART NOTE - NSCHARTNOTEFT_GEN_A_CORE
Patient 4 hrs post [place] 1 BSSO    Subj: Patient feels 5/10 pain. Advised him to ask for oxycodone if needed. Otherwise denies fever, chills, nausea, vomiting.    Maxillofacial: Jaw bra in place, ice packs in place, b/l facial edema c/w with surgery, no epistaxis  Intraoral: Occlusion appears the same as immediately after surgery, elastics in place, incision sites hemostatic, sutures intact, maxilla pink and well perfused    Advised to increase PO intake as tolerated.  Patient remains in pacu - NGT, awa, a line in place    Missy Bush  Oral and Maxillofacial Surgery  Pager [phone]

## 2023-07-06 NOTE — H&P ADULT - NSHPPHYSICALEXAM_GEN_ALL_CORE
· Constitutional	well-groomed  · Eyes	PERRL; EOMI; conjunctiva clear  · ENMT	mouth; neck  · Mouth	moist; Invisalign in place  · Neck	supple  · Respiratory	clear to auscultation bilaterally  · Cardiovascular	regular rate and rhythm; S1 S2 present  · Gastrointestinal	soft  · Genitourinary Comments	not examined  · Neurological	cranial nerves II-XII intact  · Skin	warm and dry  · Lymphatics Comments	No cervical supraclavicular lymphadenopathy palpated  · Musculoskeletal	normal gait; ROM intact; strength 5/5 bilateral upper extremities; strength 5/5 bilateral lower extremities  · Psychiatric	normal affect; alert and oriented x3

## 2023-07-06 NOTE — H&P ADULT - NSHPREVIEWOFSYSTEMS_GEN_ALL_CORE
· General	negative  · Skin/Breast	negative  · Negative Ophthalmologic Symptoms	no diplopia; no lacrimation L; no lacrimation R  · Ophthalmologic Comments	Hx Lasik surgery  · Negative ENMT Symptoms	no hearing difficulty; no sinus symptoms; no throat pain; no dysphagia  · ENMT Comments	Asymmetry of jaw - invisalign braces in place - pt denies pain  · Respiratory and Thorax	negative  · Cardiovascular	negative  · Gastrointestinal	negative  · Genitourinary	negative  · Negative Musculoskeletal Symptoms	no arthralgia; no neck pain; no back pain  · Neurological	negative  · Psychiatric	negative  · Hematology/Lymphatics	negative  · Endocrine	negative  · Allergic/Immunologic	negative  · Additional ROS	Pt denies loose teeth - braces in place   Mallampati 3

## 2023-07-06 NOTE — H&P ADULT - HISTORY OF PRESENT ILLNESS
37M presents for Maxillary Lefort 1 Osteotomy, B/L Sagittal Split Osteotomies, genioplasty with Dr Yuen on 7/6/23. Patient PMH of asymmetry of jaw.

## 2023-07-06 NOTE — PATIENT PROFILE ADULT - MONEY FOR FOOD
PEG: , on 10/06/2022  Previous: 9, on 08/23/2022    ORT: , on 10/06/2022  Previous: 0, on 08/23/2022    Oswestry Disability: , on 10/06/2022  Previous: 60%, on 08/23/2022    HPI Initial:  Rita Major is a 65 year old female with chronic low back pain that radiates to her right buttock and right thigh as described below.  Referred by Gladys Peterson DO for consultation and management.     Pain Score (0-10):  Current 8/10; Worst 10/10;  Least 5/10;  Average 9/10;  Acceptable 4/10  Duration:  11/2021  Context of pain:  Pain started insidiously without event or trauma that the patient can recall. She was most recently evaluated by Dr. Peterson in orthopedics who performed a right greater trochanteric bursa injection with 100% relief of the pain along the lateral aspect of her right hip. She continues to have pain along her low back, right buttock, and lateral aspect of right thigh.      Location: low back  Radiation: right buttock to right thigh  Quality: numbing and shooting  Improves:  sitting  Exacerbates:?  Activities of daily living, standing, lifting objects, bending      Numbness/Tingling:  Right thigh (lateral aspect)  Weakness:  none  Sleep:  6 hours interrupted  Mood:  frustrated   ?  Bowel and bladder - Denies new onset incontinence or saddle anesthesia.     SOCIAL HISTORY:  - Alcohol Abuse: No  - Substance Abuse: No     INTERVENTIONS:  1. Injections:    · 06/06/2022 - Right greater trochanteric bursa (Kristen) - 100% relief for 2 days  · 08/03/2022 Sacroiliac Injection, Right with Dr. Camara 50% pain relief for one week   2. Physical Therapy: Completed 4 sessions of PT - exacerbated pain.   3. Surgeries (Spine, joint, related to pain):   · 2018 - Right knee arthroscopy  4. Medications (pain/mood/depression): (with doses, duration of use, side effects, etc...)  Current  · Celebrex 100 mg BID PRN  · Lyrica 100 mg BID   · Tylenol taken as needed  Previous  · Medrol Dosepak         If on contract  (update)  · Urine tox screen: None  · Prescription Drug Monitoring Program verified this visit.  · Opioid contract: No       no

## 2023-07-06 NOTE — H&P ADULT - ASSESSMENT
37M with maxillary hypoplasia presents for Maxillary Lefort 1 Osteotomy, B/L Sagittal Split Osteotomies, genioplasty with Dr Yuen on 7/6/23.

## 2023-07-07 ENCOUNTER — TRANSCRIPTION ENCOUNTER (OUTPATIENT)
Age: 38
End: 2023-07-07

## 2023-07-07 RX ORDER — ACETAMINOPHEN 500 MG
2 TABLET ORAL
Refills: 0 | DISCHARGE

## 2023-07-07 RX ORDER — CHLORHEXIDINE GLUCONATE 213 G/1000ML
15 SOLUTION TOPICAL
Qty: 1 | Refills: 0
Start: 2023-07-07

## 2023-07-07 RX ORDER — IBUPROFEN 200 MG
30 TABLET ORAL
Qty: 840 | Refills: 0
Start: 2023-07-07 | End: 2023-07-13

## 2023-07-07 RX ORDER — OXYCODONE HYDROCHLORIDE 5 MG/1
5 TABLET ORAL
Qty: 40 | Refills: 0
Start: 2023-07-07 | End: 2023-07-08

## 2023-07-07 RX ORDER — OXYMETAZOLINE HYDROCHLORIDE 0.5 MG/ML
1 SPRAY NASAL
Qty: 0 | Refills: 0 | DISCHARGE
Start: 2023-07-07

## 2023-07-07 RX ORDER — ACETAMINOPHEN 500 MG
20.3 TABLET ORAL
Qty: 568.4 | Refills: 0
Start: 2023-07-07 | End: 2023-07-13

## 2023-07-07 RX ORDER — FLUTICASONE PROPIONATE 50 MCG
1 SPRAY, SUSPENSION NASAL
Qty: 1 | Refills: 0
Start: 2023-07-07

## 2023-07-07 RX ORDER — SODIUM CHLORIDE 0.65 %
2 AEROSOL, SPRAY (ML) NASAL
Qty: 1 | Refills: 0
Start: 2023-07-07

## 2023-07-07 RX ADMIN — Medication 100 MILLION UNIT(S): at 18:18

## 2023-07-07 RX ADMIN — Medication 650 MILLIGRAM(S): at 01:03

## 2023-07-07 RX ADMIN — OXYMETAZOLINE HYDROCHLORIDE 1 SPRAY(S): 0.5 SPRAY NASAL at 05:38

## 2023-07-07 RX ADMIN — OXYCODONE HYDROCHLORIDE 10 MILLIGRAM(S): 5 TABLET ORAL at 06:07

## 2023-07-07 RX ADMIN — OXYCODONE HYDROCHLORIDE 10 MILLIGRAM(S): 5 TABLET ORAL at 01:20

## 2023-07-07 RX ADMIN — Medication 100 MILLION UNIT(S): at 05:37

## 2023-07-07 RX ADMIN — CHLORHEXIDINE GLUCONATE 15 MILLILITER(S): 213 SOLUTION TOPICAL at 18:19

## 2023-07-07 RX ADMIN — Medication 600 MILLIGRAM(S): at 12:17

## 2023-07-07 RX ADMIN — Medication 100 MILLION UNIT(S): at 10:04

## 2023-07-07 RX ADMIN — OXYCODONE HYDROCHLORIDE 10 MILLIGRAM(S): 5 TABLET ORAL at 18:18

## 2023-07-07 RX ADMIN — OXYCODONE HYDROCHLORIDE 10 MILLIGRAM(S): 5 TABLET ORAL at 19:14

## 2023-07-07 RX ADMIN — Medication 600 MILLIGRAM(S): at 23:27

## 2023-07-07 RX ADMIN — Medication 650 MILLIGRAM(S): at 00:33

## 2023-07-07 RX ADMIN — Medication 1 SPRAY(S): at 00:40

## 2023-07-07 RX ADMIN — Medication 1 SPRAY(S): at 07:01

## 2023-07-07 RX ADMIN — Medication 650 MILLIGRAM(S): at 05:38

## 2023-07-07 RX ADMIN — Medication 600 MILLIGRAM(S): at 18:18

## 2023-07-07 RX ADMIN — Medication 650 MILLIGRAM(S): at 06:08

## 2023-07-07 RX ADMIN — OXYCODONE HYDROCHLORIDE 10 MILLIGRAM(S): 5 TABLET ORAL at 13:08

## 2023-07-07 RX ADMIN — Medication 100 MILLION UNIT(S): at 01:20

## 2023-07-07 RX ADMIN — Medication 100 MILLION UNIT(S): at 22:20

## 2023-07-07 RX ADMIN — OXYCODONE HYDROCHLORIDE 10 MILLIGRAM(S): 5 TABLET ORAL at 05:37

## 2023-07-07 RX ADMIN — Medication 100 MILLION UNIT(S): at 14:02

## 2023-07-07 RX ADMIN — OXYMETAZOLINE HYDROCHLORIDE 1 SPRAY(S): 0.5 SPRAY NASAL at 18:19

## 2023-07-07 RX ADMIN — Medication 600 MILLIGRAM(S): at 04:53

## 2023-07-07 RX ADMIN — CHLORHEXIDINE GLUCONATE 15 MILLILITER(S): 213 SOLUTION TOPICAL at 06:22

## 2023-07-07 RX ADMIN — Medication 600 MILLIGRAM(S): at 04:23

## 2023-07-07 RX ADMIN — OXYCODONE HYDROCHLORIDE 10 MILLIGRAM(S): 5 TABLET ORAL at 01:50

## 2023-07-07 RX ADMIN — Medication 600 MILLIGRAM(S): at 19:14

## 2023-07-07 RX ADMIN — OXYCODONE HYDROCHLORIDE 10 MILLIGRAM(S): 5 TABLET ORAL at 12:17

## 2023-07-07 RX ADMIN — SODIUM CHLORIDE 107 MILLILITER(S): 9 INJECTION, SOLUTION INTRAVENOUS at 10:04

## 2023-07-07 RX ADMIN — Medication 600 MILLIGRAM(S): at 13:08

## 2023-07-07 NOTE — DISCHARGE NOTE PROVIDER - NSDCMRMEDTOKEN_GEN_ALL_CORE_FT
acetaminophen 325 mg/10.15 mL oral liquid: 20.3 milliliter(s) orally every 6 hours  Afrin 0.05% nasal spray: 1 spray(s) nasal 2 times a day Stop after 3 days of usage  [employer]amist 0.65% nasal solution: 2 drop(s) nasal 2 times a day  Augmentin 125 mg-31.25 mg/5 mL oral liquid: 35 milliliter(s) orally 2 times a day  Children&#x27;s Advil 100 mg/5 mL oral suspension: 30 milliliter(s) orally every 6 hours  Childrens Flonase 50 mcg/inh nasal spray: 1 spray(s) nasal 2 times a day  Peridex 0.12% mucous membrane liquid: 15 milliliter(s) mucous membrane 2 times a day   acetaminophen 325 mg/10.15 mL oral liquid: 20.3 milliliter(s) orally every 6 hours  Afrin 0.05% nasal spray: 1 spray(s) nasal 2 times a day Stop after 3 days of usage  Altamist 0.65% nasal solution: 2 drop(s) nasal 2 times a day  Augmentin 125 mg-31.25 mg/5 mL oral liquid: 35 milliliter(s) orally 2 times a day  Children&#x27;s Advil 100 mg/5 mL oral suspension: 30 milliliter(s) orally every 6 hours  Childrens Flonase 50 mcg/inh nasal spray: 1 spray(s) nasal 2 times a day  oxyCODONE 5 mg/5 mL oral solution: 5 milliliter(s) orally every 6 hours MDD: 20  Peridex 0.12% mucous membrane liquid: 15 milliliter(s) mucous membrane 2 times a day

## 2023-07-07 NOTE — PROGRESS NOTE ADULT - ASSESSMENT
A/P: 37y Male s/p Lefort I and BSSO. Patient recovering well.      Plan:    - Continue abx  - Continue pain control  - Encourage PO fluids, voiding, ambulation.  - DVT PPX   A/P: 37y Male s/p Lefort I and BSSO. Patient recovering well.      Plan:    - Continue abx  - Continue pain control  - Encourage PO fluids, voiding, ambulation.  - DVT PPX      Adri Obregon DMD  OMFS #[id number] A/P: 37y Male s/p Lefort I and BSSO. Patient recovering well.      Plan:    - Continue abx  - Continue pain control  - Encourage PO fluids, voiding, ambulation.  - DVT PPX  - Possible discharge today      Adri Obregon DMD  FS #[id number]

## 2023-07-07 NOTE — DISCHARGE NOTE PROVIDER - HOSPITAL COURSE
7/6/23: Patient presents for [place] 1 / BSSO in OR  7/7/23: POD1 7/6/23: Patient presents for [place] 1 / BSSO in OR  7/7/23: POD1 37y Male s/p Lefort I and BSSO.  Patient examined at bedside.  MECHE overnight. Patient states pain is well controlled.  Denies any fever, chills, nausea, vomiting.  Patient ambulating, voiding, tolerating PO. 7/6/23: Patient presents for [place] 1 / BSSO in OR w/ Dr. Yuen. Pt underwent procedure w/o complications and uneventful post-op course.   7/7/23: POD1 37y Male s/p Lefort I and BSSO.  Patient examined at bedside.  MECHE overnight. Patient states pain is well controlled.  Denies any fever, chills, nausea, vomiting.  Patient ambulating, voiding, tolerating PO. Pt suitable for d/c today. 7/6/23: Patient presents for [place] 1 / BSSO in OR w/ Dr. Yuen. Pt underwent procedure w/o complications and uneventful post-op course.   7/7/23: POD1 37y Male s/p Lefort I and BSSO.  Patient examined at bedside.  MECHE overnight. Patient states pain is well controlled.  Denies any fever, chills, nausea, vomiting.  Patient ambulating, voiding, tolerating PO. Pt suitable for d/c today.  7/8/23: POD2 s/p Lefort I and BSSO.  Patient examined at bedside.  MECHE overnight. Patient states pain is well controlled.  Denies any fever, chills, nausea, vomiting.  Patient ambulating, voiding, tolerating PO better than yesterday.

## 2023-07-07 NOTE — PROGRESS NOTE ADULT - SUBJECTIVE AND OBJECTIVE BOX
37y Male s/p Lefort I and BSSO.  Patient examined at bedside.  MECHE overnight. Patient states pain is well controlled.  Denies any fever, chills, nausea, vomiting.  Patient ambulating, voiding, tolerating PO.    Vital Signs Last 24 Hrs  T(C): 37.1 (07 Jul 2023 01:57), Max: 37.1 (07 Jul 2023 01:57)  T(F): 98.7 (07 Jul 2023 01:57), Max: 98.7 (07 Jul 2023 01:57)  HR: 82 (07 Jul 2023 01:57) (74 - 97)  BP: 134/73 (07 Jul 2023 01:57) (112/69 - 159/101)  BP(mean): 103 (06 Jul 2023 22:30) (101 - 113)  RR: 18 (07 Jul 2023 01:57) (5 - 18)  SpO2: 98% (07 Jul 2023 01:57) (92% - 100%)    Parameters below as of 07 Jul 2023 01:57  Patient On (Oxygen Delivery Method): room air        PE:   Gen: AAOx3, NAD  EOE: b/l midface edema and mandibular edema, (  ) V3 paresthesia  IOE: Occlusion stable and reproducible, gingiva pink and perfused, elastics intact.  Sutures C/D/I.  Wounds hemostatic.              I&O's Summary    06 Jul 2023 07:01  -  07 Jul 2023 03:37  --------------------------------------------------------  IN: 1042 mL / OUT: 800 mL / NET: 242 mL           37y Male s/p Lefort I and BSSO.  Patient examined at bedside.  MECHE overnight. Patient states pain is well controlled.  Denies any fever, chills, nausea, vomiting.  Patient ambulating, voiding, tolerating PO.    Vital Signs Last 24 Hrs  T(C): 37.1 (07 Jul 2023 01:57), Max: 37.1 (07 Jul 2023 01:57)  T(F): 98.7 (07 Jul 2023 01:57), Max: 98.7 (07 Jul 2023 01:57)  HR: 82 (07 Jul 2023 01:57) (74 - 97)  BP: 134/73 (07 Jul 2023 01:57) (112/69 - 159/101)  BP(mean): 103 (06 Jul 2023 22:30) (101 - 113)  RR: 18 (07 Jul 2023 01:57) (5 - 18)  SpO2: 98% (07 Jul 2023 01:57) (92% - 100%)    Parameters below as of 07 Jul 2023 01:57  Patient On (Oxygen Delivery Method): room air        PE:   Gen: AAOx3, NAD  Maxillofacial: b/l midface edema and mandibular edema, ( + ) V3 paresthesia, no epistaxis  IOE: Occlusion stable and reproducible, gingiva pink and perfused, elastics intact.  Sutures C/D/I.  Wounds hemostatic.              I&O's Summary    06 Jul 2023 07:01  -  07 Jul 2023 03:37  --------------------------------------------------------  IN: 1042 mL / OUT: 800 mL / NET: 242 mL

## 2023-07-07 NOTE — DISCHARGE NOTE PROVIDER - NSDCHC_MEDRECSTATUS_GEN_ALL_CORE
What Is The Reason For Today's Visit?: Full Body Skin Examination Admission Reconciliation is Completed  Discharge Reconciliation is Completed

## 2023-07-07 NOTE — DISCHARGE NOTE PROVIDER - CARE PROVIDER_API CALL
Manoj Yuen  Oral/Maxillofacial Surgery  [address], [address]  [place] [address]  Phone: [phone]  Fax: [phone]  Follow Up Time: 1 week

## 2023-07-08 ENCOUNTER — TRANSCRIPTION ENCOUNTER (OUTPATIENT)
Age: 38
End: 2023-07-08

## 2023-07-08 VITALS
OXYGEN SATURATION: 100 % | RESPIRATION RATE: 19 BRPM | DIASTOLIC BLOOD PRESSURE: 96 MMHG | SYSTOLIC BLOOD PRESSURE: 137 MMHG | HEART RATE: 91 BPM | TEMPERATURE: 100 F

## 2023-07-08 RX ADMIN — OXYCODONE HYDROCHLORIDE 10 MILLIGRAM(S): 5 TABLET ORAL at 09:32

## 2023-07-08 RX ADMIN — OXYMETAZOLINE HYDROCHLORIDE 1 SPRAY(S): 0.5 SPRAY NASAL at 06:46

## 2023-07-08 RX ADMIN — Medication 100 MILLION UNIT(S): at 06:47

## 2023-07-08 RX ADMIN — Medication 1 SPRAY(S): at 08:02

## 2023-07-08 RX ADMIN — OXYCODONE HYDROCHLORIDE 5 MILLIGRAM(S): 5 TABLET ORAL at 13:36

## 2023-07-08 RX ADMIN — Medication 600 MILLIGRAM(S): at 06:46

## 2023-07-08 RX ADMIN — Medication 1 SPRAY(S): at 17:03

## 2023-07-08 RX ADMIN — OXYCODONE HYDROCHLORIDE 5 MILLIGRAM(S): 5 TABLET ORAL at 14:06

## 2023-07-08 RX ADMIN — OXYCODONE HYDROCHLORIDE 5 MILLIGRAM(S): 5 TABLET ORAL at 17:44

## 2023-07-08 RX ADMIN — Medication 100 MILLION UNIT(S): at 13:28

## 2023-07-08 RX ADMIN — Medication 100 MILLION UNIT(S): at 10:28

## 2023-07-08 RX ADMIN — OXYCODONE HYDROCHLORIDE 10 MILLIGRAM(S): 5 TABLET ORAL at 10:02

## 2023-07-08 RX ADMIN — OXYMETAZOLINE HYDROCHLORIDE 1 SPRAY(S): 0.5 SPRAY NASAL at 17:03

## 2023-07-08 RX ADMIN — CHLORHEXIDINE GLUCONATE 15 MILLILITER(S): 213 SOLUTION TOPICAL at 06:46

## 2023-07-08 RX ADMIN — Medication 1 SPRAY(S): at 13:27

## 2023-07-08 RX ADMIN — OXYCODONE HYDROCHLORIDE 5 MILLIGRAM(S): 5 TABLET ORAL at 18:14

## 2023-07-08 RX ADMIN — Medication 100 MILLION UNIT(S): at 02:16

## 2023-07-08 RX ADMIN — CHLORHEXIDINE GLUCONATE 15 MILLILITER(S): 213 SOLUTION TOPICAL at 17:03

## 2023-07-08 NOTE — PROGRESS NOTE ADULT - SUBJECTIVE AND OBJECTIVE BOX
Progress note:     37y Male POD2s/p Lefort I and BSSO.  Patient examined at bedside.  MECHE overnight. Patient states pain is well controlled.  Denies any fever, chills, nausea, vomiting.  Patient ambulating, voiding, tolerating PO better than yeasterday.      ICU Vital Signs Last 24 Hrs  T(C): 37.1 (08 Jul 2023 06:00), Max: 37.1 (08 Jul 2023 06:00)  T(F): 98.7 (08 Jul 2023 06:00), Max: 98.7 (08 Jul 2023 06:00)  HR: 83 (08 Jul 2023 06:00) (80 - 85)  BP: 122/84 (08 Jul 2023 06:00) (115/76 - 124/78)ss  BP(mean): --  ABP: --  ABP(mean): --  RR: 18 (08 Jul 2023 06:00) (17 - 20)  SpO2: 96% (08 Jul 2023 06:00) (96% - 100%)    O2 Parameters below as of 08 Jul 2023 06:00  Patient On (Oxygen Delivery Method): room air    I&O's Detail    07 Jul 2023 07:01  -  08 Jul 2023 07:00  --------------------------------------------------------  IN:    Oral Fluid: 720 mL  Total IN: 720 mL    OUT:    Voided (mL): 1575 mL  Total OUT: 1575 mL    Total NET: -855 mL      MEDICATIONS  (STANDING):  acetaminophen   Oral Liquid .. 650 milliGRAM(s) Oral every 6 hours  chlorhexidine 0.12% Liquid 15 milliLiter(s) Oral Mucosa two times a day  fluticasone propionate 50 MICROgram(s)/spray Nasal Spray 1 Spray(s) Both Nostrils <User Schedule>  ibuprofen  Suspension. 600 milliGRAM(s) Oral every 6 hours  onabotulinumtoxinA Injectable 100 Unit(s) IntraMuscular once  oxymetazoline 0.05% Nasal Spray 1 Spray(s) Both Nostrils two times a day  penicillin   G  potassium  IVPB 2 Million Unit(s) IV Intermittent every 4 hours    MEDICATIONS  (PRN):  metoclopramide Injectable 10 milliGRAM(s) IV Push once PRN Nausea and/or vomiting  morphine   Solution 2 milliGRAM(s) Oral once PRN breakthrough pain  ondansetron Injectable 4 milliGRAM(s) IV Push every 8 hours PRN Nausea and/or Vomiting  oxyCODONE    Solution 10 milliGRAM(s) Oral every 4 hours PRN Severe Pain (7 - 10)  oxyCODONE    Solution 5 milliGRAM(s) Oral every 4 hours PRN Moderate Pain (4 - 6)  sodium chloride 0.65% Nasal 1 Spray(s) Both Nostrils every 2 hours PRN Nasal Congestion        PE:     MEDICATIONS  (STANDING):  acetaminophen   Oral Liquid .. 650 milliGRAM(s) Oral every 6 hours  chlorhexidine 0.12% Liquid 15 milliLiter(s) Oral Mucosa two times a day  fluticasone propionate 50 MICROgram(s)/spray Nasal Spray 1 Spray(s) Both Nostrils <User Schedule>  ibuprofen  Suspension. 600 milliGRAM(s) Oral every 6 hours  onabotulinumtoxinA Injectable 100 Unit(s) IntraMuscular once  oxymetazoline 0.05% Nasal Spray 1 Spray(s) Both Nostrils two times a day  penicillin   G  potassium  IVPB 2 Million Unit(s) IV Intermittent every 4 hours    MEDICATIONS  (PRN):  metoclopramide Injectable 10 milliGRAM(s) IV Push once PRN Nausea and/or vomiting  morphine   Solution 2 milliGRAM(s) Oral once PRN breakthrough pain  ondansetron Injectable 4 milliGRAM(s) IV Push every 8 hours PRN Nausea and/or Vomiting  oxyCODONE    Solution 10 milliGRAM(s) Oral every 4 hours PRN Severe Pain (7 - 10)  oxyCODONE    Solution 5 milliGRAM(s) Oral every 4 hours PRN Moderate Pain (4 - 6)  sodium chloride 0.65% Nasal 1 Spray(s) Both Nostrils every 2 hours PRN Nasal Congestion  Gen: AAOx3, NAD  Maxillofacial: b/l midface edema and mandibular edema, ( + ) V3 paresthesia, no epistaxis  IOE: Occlusion stable and reproducible, gingiva pink and perfused, elastics intact.  Sutures C/D/I.  Wounds hemostatic.    MEDICATIONS  (STANDING):  acetaminophen   Oral Liquid .. 650 milliGRAM(s) Oral every 6 hours  chlorhexidine 0.12% Liquid 15 milliLiter(s) Oral Mucosa two times a day  fluticasone propionate 50 MICROgram(s)/spray Nasal Spray 1 Spray(s) Both Nostrils <User Schedule>  ibuprofen  Suspension. 600 milliGRAM(s) Oral every 6 hours  onabotulinumtoxinA Injectable 100 Unit(s) IntraMuscular once  oxymetazoline 0.05% Nasal Spray 1 Spray(s) Both Nostrils two times a day  penicillin   G  potassium  IVPB 2 Million Unit(s) IV Intermittent every 4 hours     Progress note:     37y Male POD2s/p Lefort I and BSSO.  Patient examined at bedside.  MECHE overnight. Patient states pain is well controlled.  Denies any fever, chills, nausea, vomiting.  Patient ambulating, voiding, tolerating PO better than yeasterday.      ICU Vital Signs Last 24 Hrs  T(C): 37.1 (08 Jul 2023 06:00), Max: 37.1 (08 Jul 2023 06:00)  T(F): 98.7 (08 Jul 2023 06:00), Max: 98.7 (08 Jul 2023 06:00)  HR: 83 (08 Jul 2023 06:00) (80 - 85)  BP: 122/84 (08 Jul 2023 06:00) (115/76 - 124/78)ss  BP(mean): --  ABP: --  ABP(mean): --  RR: 18 (08 Jul 2023 06:00) (17 - 20)  SpO2: 96% (08 Jul 2023 06:00) (96% - 100%)    O2 Parameters below as of 08 Jul 2023 06:00  Patient On (Oxygen Delivery Method): room air    I&O's Detail    07 Jul 2023 07:01  -  08 Jul 2023 07:00  --------------------------------------------------------  IN:    Oral Fluid: 720 mL  Total IN: 720 mL    OUT:    Voided (mL): 1575 mL  Total OUT: 1575 mL    Total NET: -855 mL      MEDICATIONS  (STANDING):  acetaminophen   Oral Liquid .. 650 milliGRAM(s) Oral every 6 hours  chlorhexidine 0.12% Liquid 15 milliLiter(s) Oral Mucosa two times a day  fluticasone propionate 50 MICROgram(s)/spray Nasal Spray 1 Spray(s) Both Nostrils <User Schedule>  ibuprofen  Suspension. 600 milliGRAM(s) Oral every 6 hours  onabotulinumtoxinA Injectable 100 Unit(s) IntraMuscular once  oxymetazoline 0.05% Nasal Spray 1 Spray(s) Both Nostrils two times a day  penicillin   G  potassium  IVPB 2 Million Unit(s) IV Intermittent every 4 hours    MEDICATIONS  (PRN):  metoclopramide Injectable 10 milliGRAM(s) IV Push once PRN Nausea and/or vomiting  morphine   Solution 2 milliGRAM(s) Oral once PRN breakthrough pain  ondansetron Injectable 4 milliGRAM(s) IV Push every 8 hours PRN Nausea and/or Vomiting  oxyCODONE    Solution 10 milliGRAM(s) Oral every 4 hours PRN Severe Pain (7 - 10)  oxyCODONE    Solution 5 milliGRAM(s) Oral every 4 hours PRN Moderate Pain (4 - 6)  sodium chloride 0.65% Nasal 1 Spray(s) Both Nostrils every 2 hours PRN Nasal Congestion        PE:   Gen: AAOx3, NAD  HEENT: generalized facial edema c/w normal postsurgical manipulation, dried heme on nares, occlusion stable and reproducible, intraoral incisions tissues approximated, sutures C/I, gingiva pink and perfused, occlusal splint in place, IMF screws in place with elastics, secure and tight    MEDICATIONS  (STANDING):  acetaminophen   Oral Liquid .. 650 milliGRAM(s) Oral every 6 hours  chlorhexidine 0.12% Liquid 15 milliLiter(s) Oral Mucosa two times a day  fluticasone propionate 50 MICROgram(s)/spray Nasal Spray 1 Spray(s) Both Nostrils <User Schedule>  ibuprofen  Suspension. 600 milliGRAM(s) Oral every 6 hours  onabotulinumtoxinA Injectable 100 Unit(s) IntraMuscular once  oxymetazoline 0.05% Nasal Spray 1 Spray(s) Both Nostrils two times a day  penicillin   G  potassium  IVPB 2 Million Unit(s) IV Intermittent every 4 hours    MEDICATIONS  (PRN):  metoclopramide Injectable 10 milliGRAM(s) IV Push once PRN Nausea and/or vomiting  morphine   Solution 2 milliGRAM(s) Oral once PRN breakthrough pain  ondansetron Injectable 4 milliGRAM(s) IV Push every 8 hours PRN Nausea and/or Vomiting  oxyCODONE    Solution 10 milliGRAM(s) Oral every 4 hours PRN Severe Pain (7 - 10)  oxyCODONE    Solution 5 milliGRAM(s) Oral every 4 hours PRN Moderate Pain (4 - 6)  sodium chloride 0.65% Nasal 1 Spray(s) Both Nostrils every 2 hours PRN Nasal Congestion  Gen: AAOx3, NAD  Maxillofacial: b/l midface edema and mandibular edema, ( + ) V3 paresthesia, no epistaxis  IOE: Occlusion stable and reproducible, gingiva pink and perfused, elastics intact.  Sutures C/D/I.  Wounds hemostatic.    MEDICATIONS  (STANDING):  acetaminophen   Oral Liquid .. 650 milliGRAM(s) Oral every 6 hours  chlorhexidine 0.12% Liquid 15 milliLiter(s) Oral Mucosa two times a day  fluticasone propionate 50 MICROgram(s)/spray Nasal Spray 1 Spray(s) Both Nostrils <User Schedule>  ibuprofen  Suspension. 600 milliGRAM(s) Oral every 6 hours  onabotulinumtoxinA Injectable 100 Unit(s) IntraMuscular once  oxymetazoline 0.05% Nasal Spray 1 Spray(s) Both Nostrils two times a day  penicillin   G  potassium  IVPB 2 Million Unit(s) IV Intermittent every 4 hours

## 2023-07-08 NOTE — PROGRESS NOTE ADULT - ASSESSMENT
A/P: 37y Male POD2 s/p Lefort I and BSSO. Patient recovering well.      Plan:  - Continue abx  - Continue pain control  - DVT PPX  - D/C today

## 2023-07-08 NOTE — DISCHARGE NOTE NURSING/CASE MANAGEMENT/SOCIAL WORK - NSDCPEFALRISK_GEN_ALL_CORE
For information on Fall & Injury Prevention, visit: [email or web address] OR  [email or web address] OR  https://www.cdc.gov/steadi/patient.html

## 2023-07-08 NOTE — PROGRESS NOTE ADULT - SUBJECTIVE AND OBJECTIVE BOX
I&O's Detail    06 Jul 2023 07:01  -  07 Jul 2023 07:00  --------------------------------------------------------  IN:    IV PiggyBack: 400 mL    Lactated Ringers: 1070 mL    Oral Fluid: 340 mL  Total IN: 1810 mL    OUT:    Indwelling Catheter - Urethral (mL): 550 mL    Nasogastric/Oral tube (mL): 0 mL    Voided (mL): 350 mL  Total OUT: 900 mL    Total NET: 910 mL      07 Jul 2023 07:01  -  08 Jul 2023 00:44  --------------------------------------------------------  IN:    Oral Fluid: 520 mL  Total IN: 520 mL    OUT:    Voided (mL): 900 mL  Total OUT: 900 mL    Total NET: -380 mL      37y Male s/p Lefort I and BSSO.  Patient examined at bedside.  EMCHE overnight. Patient states pain is well controlled.  Denies any fever, chills, nausea, vomiting.  Patient ambulating, voiding, tolerating PO.    PE:   Gen: AAOx3, NAD  Maxillofacial: b/l midface edema and mandibular edema, ( + ) V3 paresthesia, no epistaxis  IOE: Occlusion stable and reproducible, gingiva pink and perfused, elastics intact.  Sutures C/D/I.  Wounds hemostatic.    MEDICATIONS  (STANDING):  acetaminophen   Oral Liquid .. 650 milliGRAM(s) Oral every 6 hours  chlorhexidine 0.12% Liquid 15 milliLiter(s) Oral Mucosa two times a day  fluticasone propionate 50 MICROgram(s)/spray Nasal Spray 1 Spray(s) Both Nostrils <User Schedule>  ibuprofen  Suspension. 600 milliGRAM(s) Oral every 6 hours  onabotulinumtoxinA Injectable 100 Unit(s) IntraMuscular once  oxymetazoline 0.05% Nasal Spray 1 Spray(s) Both Nostrils two times a day  penicillin   G  potassium  IVPB 2 Million Unit(s) IV Intermittent every 4 hours    MEDICATIONS  (PRN):  metoclopramide Injectable 10 milliGRAM(s) IV Push once PRN Nausea and/or vomiting  morphine   Solution 2 milliGRAM(s) Oral once PRN breakthrough pain  ondansetron Injectable 4 milliGRAM(s) IV Push every 8 hours PRN Nausea and/or Vomiting  oxyCODONE    Solution 10 milliGRAM(s) Oral every 4 hours PRN Severe Pain (7 - 10)  oxyCODONE    Solution 5 milliGRAM(s) Oral every 4 hours PRN Moderate Pain (4 - 6)  sodium chloride 0.65% Nasal 1 Spray(s) Both Nostrils every 2 hours PRN Nasal Congestion    ICU Vital Signs Last 24 Hrs  T(C): 36.5 (07 Jul 2023 22:00), Max: 37.1 (07 Jul 2023 01:57)  T(F): 97.7 (07 Jul 2023 22:00), Max: 98.7 (07 Jul 2023 01:57)  HR: 80 (07 Jul 2023 22:00) (80 - 97)  BP: 124/78 (07 Jul 2023 22:00) (115/76 - 134/73)  BP(mean): --  ABP: --  ABP(mean): --  RR: 18 (07 Jul 2023 22:00) (17 - 20)  SpO2: 99% (07 Jul 2023 22:00) (96% - 99%)    O2 Parameters below as of 07 Jul 2023 22:00  Patient On (Oxygen Delivery Method): room air     Progress note:     37y Male POD2s/p Lefort I and BSSO.  Patient examined at bedside.  MECHE overnight. Patient states pain is well controlled.  Denies any fever, chills, nausea, vomiting.  Patient ambulating, voiding, tolerating PO better than yeasterday.      ICU Vital Signs Last 24 Hrs  T(C): 37.1 (08 Jul 2023 06:00), Max: 37.1 (08 Jul 2023 06:00)  T(F): 98.7 (08 Jul 2023 06:00), Max: 98.7 (08 Jul 2023 06:00)  HR: 83 (08 Jul 2023 06:00) (80 - 85)  BP: 122/84 (08 Jul 2023 06:00) (115/76 - 124/78)  BP(mean): --  ABP: --  ABP(mean): --  RR: 18 (08 Jul 2023 06:00) (17 - 20)  SpO2: 96% (08 Jul 2023 06:00) (96% - 100%)    O2 Parameters below as of 08 Jul 2023 06:00  Patient On (Oxygen Delivery Method): room air    I&O's Detail    07 Jul 2023 07:01  -  08 Jul 2023 07:00  --------------------------------------------------------  IN:    Oral Fluid: 720 mL  Total IN: 720 mL    OUT:    Voided (mL): 1575 mL  Total OUT: 1575 mL    Total NET: -855 mL      MEDICATIONS  (STANDING):  acetaminophen   Oral Liquid .. 650 milliGRAM(s) Oral every 6 hours  chlorhexidine 0.12% Liquid 15 milliLiter(s) Oral Mucosa two times a day  fluticasone propionate 50 MICROgram(s)/spray Nasal Spray 1 Spray(s) Both Nostrils <User Schedule>  ibuprofen  Suspension. 600 milliGRAM(s) Oral every 6 hours  onabotulinumtoxinA Injectable 100 Unit(s) IntraMuscular once  oxymetazoline 0.05% Nasal Spray 1 Spray(s) Both Nostrils two times a day  penicillin   G  potassium  IVPB 2 Million Unit(s) IV Intermittent every 4 hours    MEDICATIONS  (PRN):  metoclopramide Injectable 10 milliGRAM(s) IV Push once PRN Nausea and/or vomiting  morphine   Solution 2 milliGRAM(s) Oral once PRN breakthrough pain  ondansetron Injectable 4 milliGRAM(s) IV Push every 8 hours PRN Nausea and/or Vomiting  oxyCODONE    Solution 10 milliGRAM(s) Oral every 4 hours PRN Severe Pain (7 - 10)  oxyCODONE    Solution 5 milliGRAM(s) Oral every 4 hours PRN Moderate Pain (4 - 6)  sodium chloride 0.65% Nasal 1 Spray(s) Both Nostrils every 2 hours PRN Nasal Congestion        PE:     MEDICATIONS  (STANDING):  acetaminophen   Oral Liquid .. 650 milliGRAM(s) Oral every 6 hours  chlorhexidine 0.12% Liquid 15 milliLiter(s) Oral Mucosa two times a day  fluticasone propionate 50 MICROgram(s)/spray Nasal Spray 1 Spray(s) Both Nostrils <User Schedule>  ibuprofen  Suspension. 600 milliGRAM(s) Oral every 6 hours  onabotulinumtoxinA Injectable 100 Unit(s) IntraMuscular once  oxymetazoline 0.05% Nasal Spray 1 Spray(s) Both Nostrils two times a day  penicillin   G  potassium  IVPB 2 Million Unit(s) IV Intermittent every 4 hours    MEDICATIONS  (PRN):  metoclopramide Injectable 10 milliGRAM(s) IV Push once PRN Nausea and/or vomiting  morphine   Solution 2 milliGRAM(s) Oral once PRN breakthrough pain  ondansetron Injectable 4 milliGRAM(s) IV Push every 8 hours PRN Nausea and/or Vomiting  oxyCODONE    Solution 10 milliGRAM(s) Oral every 4 hours PRN Severe Pain (7 - 10)  oxyCODONE    Solution 5 milliGRAM(s) Oral every 4 hours PRN Moderate Pain (4 - 6)  sodium chloride 0.65% Nasal 1 Spray(s) Both Nostrils every 2 hours PRN Nasal Congestion  Gen: AAOx3, NAD  Maxillofacial: b/l midface edema and mandibular edema, ( + ) V3 paresthesia, no epistaxis  IOE: Occlusion stable and reproducible, gingiva pink and perfused, elastics intact.  Sutures C/D/I.  Wounds hemostatic.    MEDICATIONS  (STANDING):  acetaminophen   Oral Liquid .. 650 milliGRAM(s) Oral every 6 hours  chlorhexidine 0.12% Liquid 15 milliLiter(s) Oral Mucosa two times a day  fluticasone propionate 50 MICROgram(s)/spray Nasal Spray 1 Spray(s) Both Nostrils <User Schedule>  ibuprofen  Suspension. 600 milliGRAM(s) Oral every 6 hours  onabotulinumtoxinA Injectable 100 Unit(s) IntraMuscular once  oxymetazoline 0.05% Nasal Spray 1 Spray(s) Both Nostrils two times a day  penicillin   G  potassium  IVPB 2 Million Unit(s) IV Intermittent every 4 hours       Progress note:     37y Male POD2s/p Lefort I and BSSO.  Patient examined at bedside.  MECHE overnight. Patient states pain is well controlled.  Denies any fever, chills, nausea, vomiting.  Patient ambulating, voiding, tolerating PO better than yeasterday.      ICU Vital Signs Last 24 Hrs  T(C): 37.1 (08 Jul 2023 06:00), Max: 37.1 (08 Jul 2023 06:00)  T(F): 98.7 (08 Jul 2023 06:00), Max: 98.7 (08 Jul 2023 06:00)  HR: 83 (08 Jul 2023 06:00) (80 - 85)  BP: 122/84 (08 Jul 2023 06:00) (115/76 - 124/78)ss  BP(mean): --  ABP: --  ABP(mean): --  RR: 18 (08 Jul 2023 06:00) (17 - 20)  SpO2: 96% (08 Jul 2023 06:00) (96% - 100%)    O2 Parameters below as of 08 Jul 2023 06:00  Patient On (Oxygen Delivery Method): room air    I&O's Detail    07 Jul 2023 07:01  -  08 Jul 2023 07:00  --------------------------------------------------------  IN:    Oral Fluid: 720 mL  Total IN: 720 mL    OUT:    Voided (mL): 1575 mL  Total OUT: 1575 mL    Total NET: -855 mL      MEDICATIONS  (STANDING):  acetaminophen   Oral Liquid .. 650 milliGRAM(s) Oral every 6 hours  chlorhexidine 0.12% Liquid 15 milliLiter(s) Oral Mucosa two times a day  fluticasone propionate 50 MICROgram(s)/spray Nasal Spray 1 Spray(s) Both Nostrils <User Schedule>  ibuprofen  Suspension. 600 milliGRAM(s) Oral every 6 hours  onabotulinumtoxinA Injectable 100 Unit(s) IntraMuscular once  oxymetazoline 0.05% Nasal Spray 1 Spray(s) Both Nostrils two times a day  penicillin   G  potassium  IVPB 2 Million Unit(s) IV Intermittent every 4 hours    MEDICATIONS  (PRN):  metoclopramide Injectable 10 milliGRAM(s) IV Push once PRN Nausea and/or vomiting  morphine   Solution 2 milliGRAM(s) Oral once PRN breakthrough pain  ondansetron Injectable 4 milliGRAM(s) IV Push every 8 hours PRN Nausea and/or Vomiting  oxyCODONE    Solution 10 milliGRAM(s) Oral every 4 hours PRN Severe Pain (7 - 10)  oxyCODONE    Solution 5 milliGRAM(s) Oral every 4 hours PRN Moderate Pain (4 - 6)  sodium chloride 0.65% Nasal 1 Spray(s) Both Nostrils every 2 hours PRN Nasal Congestion        PE:     MEDICATIONS  (STANDING):  acetaminophen   Oral Liquid .. 650 milliGRAM(s) Oral every 6 hours  chlorhexidine 0.12% Liquid 15 milliLiter(s) Oral Mucosa two times a day  fluticasone propionate 50 MICROgram(s)/spray Nasal Spray 1 Spray(s) Both Nostrils <User Schedule>  ibuprofen  Suspension. 600 milliGRAM(s) Oral every 6 hours  onabotulinumtoxinA Injectable 100 Unit(s) IntraMuscular once  oxymetazoline 0.05% Nasal Spray 1 Spray(s) Both Nostrils two times a day  penicillin   G  potassium  IVPB 2 Million Unit(s) IV Intermittent every 4 hours    MEDICATIONS  (PRN):  metoclopramide Injectable 10 milliGRAM(s) IV Push once PRN Nausea and/or vomiting  morphine   Solution 2 milliGRAM(s) Oral once PRN breakthrough pain  ondansetron Injectable 4 milliGRAM(s) IV Push every 8 hours PRN Nausea and/or Vomiting  oxyCODONE    Solution 10 milliGRAM(s) Oral every 4 hours PRN Severe Pain (7 - 10)  oxyCODONE    Solution 5 milliGRAM(s) Oral every 4 hours PRN Moderate Pain (4 - 6)  sodium chloride 0.65% Nasal 1 Spray(s) Both Nostrils every 2 hours PRN Nasal Congestion  Gen: AAOx3, NAD  Maxillofacial: b/l midface edema and mandibular edema, ( + ) V3 paresthesia, no epistaxis  IOE: Occlusion stable and reproducible, gingiva pink and perfused, elastics intact.  Sutures C/D/I.  Wounds hemostatic.    MEDICATIONS  (STANDING):  acetaminophen   Oral Liquid .. 650 milliGRAM(s) Oral every 6 hours  chlorhexidine 0.12% Liquid 15 milliLiter(s) Oral Mucosa two times a day  fluticasone propionate 50 MICROgram(s)/spray Nasal Spray 1 Spray(s) Both Nostrils <User Schedule>  ibuprofen  Suspension. 600 milliGRAM(s) Oral every 6 hours  onabotulinumtoxinA Injectable 100 Unit(s) IntraMuscular once  oxymetazoline 0.05% Nasal Spray 1 Spray(s) Both Nostrils two times a day  penicillin   G  potassium  IVPB 2 Million Unit(s) IV Intermittent every 4 hours

## 2023-07-08 NOTE — PROGRESS NOTE ADULT - ASSESSMENT
A/P: 37y Male s/p Lefort I and BSSO. Patient recovering well.      Plan:      Marquez Tavares  Oral and Maxillofacial Surgery  [facility] OMFS Pager #[phone] A/P: 37y Male POD2 s/p Lefort I and BSSO. Patient recovering well.      Plan:  - Continue abx  - Continue pain control  - DVT PPX  - D/C today      Marquez Tavares  Oral and Maxillofacial Surgery  [facility] Pager #[phone]

## 2023-07-08 NOTE — DISCHARGE NOTE NURSING/CASE MANAGEMENT/SOCIAL WORK - PATIENT PORTAL LINK FT
You can access the FollowMyHealth Patient Portal offered by [facility] by registering at the following website: [email or web address]. By joining [employer]’s FollowMyHealth portal, you will also be able to view your health information using other applications (apps) compatible with our system.

## 2023-07-11 ENCOUNTER — NON-APPOINTMENT (OUTPATIENT)
Age: 38
End: 2023-07-11

## 2023-07-25 ENCOUNTER — APPOINTMENT (OUTPATIENT)
Dept: INTERNAL MEDICINE | Facility: CLINIC | Age: 38
End: 2023-07-25
Payer: COMMERCIAL

## 2023-07-25 VITALS
DIASTOLIC BLOOD PRESSURE: 77 MMHG | SYSTOLIC BLOOD PRESSURE: 117 MMHG | HEIGHT: 68 IN | OXYGEN SATURATION: 98 % | HEART RATE: 67 BPM | WEIGHT: 160 LBS | BODY MASS INDEX: 24.25 KG/M2

## 2023-07-25 DIAGNOSIS — Z00.00 ENCOUNTER FOR GENERAL ADULT MEDICAL EXAMINATION W/OUT ABNORMAL FINDINGS: ICD-10-CM

## 2023-07-25 DIAGNOSIS — K76.0 FATTY (CHANGE OF) LIVER, NOT ELSEWHERE CLASSIFIED: ICD-10-CM

## 2023-07-25 DIAGNOSIS — Z12.11 ENCOUNTER FOR SCREENING FOR MALIGNANT NEOPLASM OF COLON: ICD-10-CM

## 2023-07-25 DIAGNOSIS — R73.03 PREDIABETES.: ICD-10-CM

## 2023-07-25 DIAGNOSIS — R74.8 ABNORMAL LEVELS OF OTHER SERUM ENZYMES: ICD-10-CM

## 2023-07-25 DIAGNOSIS — Z87.891 PERSONAL HISTORY OF NICOTINE DEPENDENCE: ICD-10-CM

## 2023-07-25 DIAGNOSIS — E07.9 DISORDER OF THYROID, UNSPECIFIED: ICD-10-CM

## 2023-07-25 DIAGNOSIS — Z83.3 FAMILY HISTORY OF DIABETES MELLITUS: ICD-10-CM

## 2023-07-25 DIAGNOSIS — R79.89 OTHER SPECIFIED ABNORMAL FINDINGS OF BLOOD CHEMISTRY: ICD-10-CM

## 2023-07-25 DIAGNOSIS — Z80.0 FAMILY HISTORY OF MALIGNANT NEOPLASM OF DIGESTIVE ORGANS: ICD-10-CM

## 2023-07-25 PROCEDURE — 99395 PREV VISIT EST AGE 18-39: CPT | Mod: 25

## 2023-07-25 PROCEDURE — 36415 COLL VENOUS BLD VENIPUNCTURE: CPT

## 2023-07-25 NOTE — PLAN
[FreeTextEntry1] : Mr. ROD CHAO 37 year  male  with a PMH prediabetes, elevated LFT's, fatty liver present to the office for a physical exam. \par Well adult exam is performed. Recommend  to do a blood test today, further management will depend on the blood test results. \par \par f/u cbc, cea, a1c, cmp, ggt, iron, lipids, tsh, vitamin d, cxr\par F/u with  gi for colonoscopy \par Former smoker - do a cxr\par F/u with an oral surgeon(recent surgery)\par  RTC to f/u in 2 wks. Patient is verbalized understanding\par

## 2023-07-25 NOTE — HEALTH RISK ASSESSMENT
[Excellent] : ~his/her~  mood as  excellent [No] : In the past 12 months have you used drugs other than those required for medical reasons? No [No falls in past year] : Patient reported no falls in the past year [0] : 2) Feeling down, depressed, or hopeless: Not at all (0) [PHQ-2 Negative - No further assessment needed] : PHQ-2 Negative - No further assessment needed [HIV test declined] : HIV test declined [Hepatitis C test declined] : Hepatitis C test declined [None] : None [With Family] : lives with family [# of Members in Household ___] :  household currently consist of [unfilled] member(s) [Employed] : employed [College] : College [] :  [Sexually Active] : sexually active [Feels Safe at Home] : Feels safe at home [Fully functional (bathing, dressing, toileting, transferring, walking, feeding)] : Fully functional (bathing, dressing, toileting, transferring, walking, feeding) [Fully functional (using the telephone, shopping, preparing meals, housekeeping, doing laundry, using] : Fully functional and needs no help or supervision to perform IADLs (using the telephone, shopping, preparing meals, housekeeping, doing laundry, using transportation, managing medications and managing finances) [Reports normal functional visual acuity (ie: able to read med bottle)] : Reports normal functional visual acuity [Reports changes in dental health] : Reports changes in dental health [Smoke Detector] : smoke detector [Carbon Monoxide Detector] : carbon monoxide detector [Safety elements used in home] : safety elements used in home [Seat Belt] :  uses seat belt [Sunscreen] : uses sunscreen [Patient/Caregiver not ready to engage] : , patient/caregiver not ready to engage [Former] : Former [0-4] : 0-4 [FreeTextEntry1] : lipids, screen for colon cancer d/t family hx  [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, hepatologist, oncology  [de-identified] : socially until several months ago and now none [de-identified] : yard/car work  [[id number]] : 0 [Change in mental status noted] : No change in mental status noted [Language] : denies difficulty with language [Behavior] : denies difficulty with behavior [Learning/Retaining New Information] : denies difficulty learning/retaining new information [Handling Complex Tasks] : denies difficulty handling complex tasks [Reasoning] : denies difficulty with reasoning [Spatial Ability and Orientation] : denies difficulty with spatial ability and orientation [High Risk Behavior] : no high risk behavior [Reports changes in hearing] : Reports no changes in hearing [Reports changes in vision] : Reports no changes in vision [Guns at Home] : no guns at home [Travel to Developing Areas] : does not  travel to developing areas [TB Exposure] : is not being exposed to tuberculosis [Caregiver Concerns] : does not have caregiver concerns [FreeTextEntry2] :  [de-identified] : recent jaw surgery 7/7/2023 [< 15 Years] : < 15 Years

## 2023-07-25 NOTE — HISTORY OF PRESENT ILLNESS
[Spouse] : spouse [FreeTextEntry1] : Establishing care with new PCP\par \par  [de-identified] : Mr. ROD CHAO 37 year  male  with a PMH prediabetes, elevated LFT's, fatty liver present to the office for a physical exam.  Patient feels good, denies complaints at present time. Recent family history of colon cancer mid 40th.(maternal uncle).  Wants to get referral to see GI(wants to do a colonoscopy). Also want to check for cancer screening. Recently he underwent surgery(overbite). Stay in the hospital  from 07/06-07/08\par

## 2023-07-26 LAB
25(OH)D3 SERPL-MCNC: 36.8 NG/ML
ALBUMIN SERPL ELPH-MCNC: 4.9 G/DL
ALP BLD-CCNC: 69 U/L
ALT SERPL-CCNC: 45 U/L
ANION GAP SERPL CALC-SCNC: 12 MMOL/L
AST SERPL-CCNC: 22 U/L
BILIRUB SERPL-MCNC: 0.4 MG/DL
BUN SERPL-MCNC: 30 MG/DL
CALCIUM SERPL-MCNC: 10.4 MG/DL
CEA SERPL-MCNC: 1.1 NG/ML
CHLORIDE SERPL-SCNC: 101 MMOL/L
CHOLEST SERPL-MCNC: 125 MG/DL
CO2 SERPL-SCNC: 26 MMOL/L
CREAT SERPL-MCNC: 0.94 MG/DL
EGFR: 108 ML/MIN/1.73M2
ESTIMATED AVERAGE GLUCOSE: 126 MG/DL
GGT SERPL-CCNC: 32 U/L
GLUCOSE SERPL-MCNC: 79 MG/DL
HBA1C MFR BLD HPLC: 6 %
HCT VFR BLD CALC: 37.8 %
HDLC SERPL-MCNC: 32 MG/DL
HGB BLD-MCNC: 11.3 G/DL
IRON SATN MFR SERPL: 14 %
IRON SERPL-MCNC: 59 UG/DL
LDLC SERPL CALC-MCNC: 77 MG/DL
MCHC RBC-ENTMCNC: 21 PG
MCHC RBC-ENTMCNC: 29.9 GM/DL
MCV RBC AUTO: 70.1 FL
NONHDLC SERPL-MCNC: 93 MG/DL
PLATELET # BLD AUTO: 331 K/UL
POTASSIUM SERPL-SCNC: 4.7 MMOL/L
PROT SERPL-MCNC: 8 G/DL
RBC # BLD: 5.39 M/UL
RBC # FLD: 15.2 %
SODIUM SERPL-SCNC: 139 MMOL/L
TIBC SERPL-MCNC: 425 UG/DL
TRIGL SERPL-MCNC: 81 MG/DL
TSH SERPL-ACNC: 0.61 UIU/ML
UIBC SERPL-MCNC: 366 UG/DL
WBC # FLD AUTO: 7.88 K/UL

## 2023-07-28 PROBLEM — M26.02 MAXILLARY HYPOPLASIA: Chronic | Status: ACTIVE | Noted: 2023-06-23

## 2023-07-29 ENCOUNTER — APPOINTMENT (OUTPATIENT)
Dept: RADIOLOGY | Facility: CLINIC | Age: 38
End: 2023-07-29
Payer: COMMERCIAL

## 2023-07-29 ENCOUNTER — OUTPATIENT (OUTPATIENT)
Dept: OUTPATIENT SERVICES | Facility: HOSPITAL | Age: 38
LOS: 1 days | End: 2023-07-29
Payer: COMMERCIAL

## 2023-07-29 DIAGNOSIS — Z98.890 OTHER SPECIFIED POSTPROCEDURAL STATES: Chronic | ICD-10-CM

## 2023-07-29 DIAGNOSIS — Z00.00 ENCOUNTER FOR GENERAL ADULT MEDICAL EXAMINATION WITHOUT ABNORMAL FINDINGS: ICD-10-CM

## 2023-07-29 PROCEDURE — 71046 X-RAY EXAM CHEST 2 VIEWS: CPT | Mod: 26

## 2023-07-29 PROCEDURE — 71046 X-RAY EXAM CHEST 2 VIEWS: CPT

## 2023-08-12 NOTE — ASU PATIENT PROFILE, ADULT - NS PRO MODE OF ARRIVAL
PULMONOLOGY RECOMMENDATIONS:  Continue the Anoro for now and continue as an outpatient  Complete a prednisone taper over the next 10 days  Continue with the prednisone for now   Consider fluid restriction given the question of SIADH.  Will follow up angiotensin-converting enzyme level, get echocardiogram and follow sodium level  Maintain BiPAP settings to an IPAP of 19 and EPAP of 14  Will arrange follow up with Pulmonology as an outpatient.   ambulatory

## 2024-04-01 ENCOUNTER — APPOINTMENT (OUTPATIENT)
Dept: OPHTHALMOLOGY | Facility: CLINIC | Age: 39
End: 2024-04-01

## 2024-06-27 ENCOUNTER — APPOINTMENT (OUTPATIENT)
Dept: OPHTHALMOLOGY | Facility: CLINIC | Age: 39
End: 2024-06-27
Payer: COMMERCIAL

## 2024-06-27 ENCOUNTER — NON-APPOINTMENT (OUTPATIENT)
Age: 39
End: 2024-06-27

## 2024-06-27 PROCEDURE — 92025 CPTRIZED CORNEAL TOPOGRAPHY: CPT

## 2024-06-27 PROCEDURE — 92012 INTRM OPH EXAM EST PATIENT: CPT

## 2024-08-14 ENCOUNTER — APPOINTMENT (OUTPATIENT)
Dept: INTERNAL MEDICINE | Facility: CLINIC | Age: 39
End: 2024-08-14
Payer: COMMERCIAL

## 2024-08-14 VITALS
SYSTOLIC BLOOD PRESSURE: 134 MMHG | BODY MASS INDEX: 22.73 KG/M2 | HEIGHT: 68 IN | OXYGEN SATURATION: 98 % | TEMPERATURE: 98 F | WEIGHT: 150 LBS | DIASTOLIC BLOOD PRESSURE: 88 MMHG | HEART RATE: 77 BPM

## 2024-08-14 DIAGNOSIS — M54.9 DORSALGIA, UNSPECIFIED: ICD-10-CM

## 2024-08-14 DIAGNOSIS — R73.03 PREDIABETES.: ICD-10-CM

## 2024-08-14 DIAGNOSIS — Z00.00 ENCOUNTER FOR GENERAL ADULT MEDICAL EXAMINATION W/OUT ABNORMAL FINDINGS: ICD-10-CM

## 2024-08-14 DIAGNOSIS — D64.9 ANEMIA, UNSPECIFIED: ICD-10-CM

## 2024-08-14 PROCEDURE — 99395 PREV VISIT EST AGE 18-39: CPT

## 2024-08-19 LAB
ALBUMIN SERPL ELPH-MCNC: 5.1 G/DL
ALP BLD-CCNC: 76 U/L
ALT SERPL-CCNC: 27 U/L
ANION GAP SERPL CALC-SCNC: 14 MMOL/L
AST SERPL-CCNC: 19 U/L
BILIRUB SERPL-MCNC: 0.8 MG/DL
BUN SERPL-MCNC: 15 MG/DL
CALCIUM SERPL-MCNC: 10.1 MG/DL
CHLORIDE SERPL-SCNC: 103 MMOL/L
CHOLEST SERPL-MCNC: 153 MG/DL
CO2 SERPL-SCNC: 25 MMOL/L
CREAT SERPL-MCNC: 1.03 MG/DL
EGFR: 96 ML/MIN/1.73M2
ESTIMATED AVERAGE GLUCOSE: 120 MG/DL
FERRITIN SERPL-MCNC: 79 NG/ML
FOLATE SERPL-MCNC: 14.7 NG/ML
GLUCOSE SERPL-MCNC: 87 MG/DL
HBA1C MFR BLD HPLC: 5.8 %
HCT VFR BLD CALC: 44.4 %
HDLC SERPL-MCNC: 46 MG/DL
HGB A MFR BLD: 98 %
HGB A2 MFR BLD: 2 %
HGB BLD-MCNC: 12.6 G/DL
HGB FRACT BLD-IMP: NORMAL
IRON SATN MFR SERPL: 18 %
IRON SERPL-MCNC: 83 UG/DL
LDLC SERPL CALC-MCNC: 87 MG/DL
MCHC RBC-ENTMCNC: 21 PG
MCHC RBC-ENTMCNC: 28.4 GM/DL
MCV RBC AUTO: 73.9 FL
NONHDLC SERPL-MCNC: 107 MG/DL
PLATELET # BLD AUTO: 209 K/UL
POTASSIUM SERPL-SCNC: 4.2 MMOL/L
PROT SERPL-MCNC: 8 G/DL
RBC # BLD: 6.01 M/UL
RBC # FLD: 19.3 %
SODIUM SERPL-SCNC: 142 MMOL/L
TIBC SERPL-MCNC: 451 UG/DL
TRIGL SERPL-MCNC: 111 MG/DL
UIBC SERPL-MCNC: 368 UG/DL
VIT B12 SERPL-MCNC: 867 PG/ML
WBC # FLD AUTO: 7.38 K/UL

## 2025-02-03 ENCOUNTER — APPOINTMENT (OUTPATIENT)
Dept: INTERNAL MEDICINE | Facility: CLINIC | Age: 40
End: 2025-02-03
Payer: COMMERCIAL

## 2025-02-03 ENCOUNTER — NON-APPOINTMENT (OUTPATIENT)
Age: 40
End: 2025-02-03

## 2025-02-03 ENCOUNTER — LABORATORY RESULT (OUTPATIENT)
Age: 40
End: 2025-02-03

## 2025-02-03 VITALS
HEART RATE: 85 BPM | HEIGHT: 68 IN | BODY MASS INDEX: 22.59 KG/M2 | WEIGHT: 149.06 LBS | OXYGEN SATURATION: 99 % | DIASTOLIC BLOOD PRESSURE: 88 MMHG | SYSTOLIC BLOOD PRESSURE: 116 MMHG

## 2025-02-03 DIAGNOSIS — R73.03 PREDIABETES.: ICD-10-CM

## 2025-02-03 DIAGNOSIS — K76.0 FATTY (CHANGE OF) LIVER, NOT ELSEWHERE CLASSIFIED: ICD-10-CM

## 2025-02-03 DIAGNOSIS — R06.2 WHEEZING: ICD-10-CM

## 2025-02-03 DIAGNOSIS — R07.9 CHEST PAIN, UNSPECIFIED: ICD-10-CM

## 2025-02-03 DIAGNOSIS — R05.1 ACUTE COUGH: ICD-10-CM

## 2025-02-03 DIAGNOSIS — Z12.5 ENCOUNTER FOR SCREENING FOR MALIGNANT NEOPLASM OF PROSTATE: ICD-10-CM

## 2025-02-03 DIAGNOSIS — D64.9 ANEMIA, UNSPECIFIED: ICD-10-CM

## 2025-02-03 PROCEDURE — 99214 OFFICE O/P EST MOD 30 MIN: CPT

## 2025-02-03 PROCEDURE — 93000 ELECTROCARDIOGRAM COMPLETE: CPT

## 2025-02-03 PROCEDURE — 36415 COLL VENOUS BLD VENIPUNCTURE: CPT

## 2025-02-03 RX ORDER — ALBUTEROL SULFATE 90 UG/1
108 (90 BASE) INHALANT RESPIRATORY (INHALATION)
Qty: 3 | Refills: 3 | Status: ACTIVE | COMMUNITY
Start: 2025-02-03 | End: 1900-01-01

## 2025-02-03 RX ORDER — PREDNISONE 20 MG/1
20 TABLET ORAL DAILY
Qty: 10 | Refills: 0 | Status: ACTIVE | COMMUNITY
Start: 2025-02-03 | End: 1900-01-01

## 2025-02-07 LAB
25(OH)D3 SERPL-MCNC: 41.4 NG/ML
ALBUMIN SERPL ELPH-MCNC: 5.1 G/DL
ALP BLD-CCNC: 76 U/L
ALT SERPL-CCNC: 43 U/L
ANION GAP SERPL CALC-SCNC: 15 MMOL/L
AST SERPL-CCNC: 25 U/L
BASOPHILS # BLD AUTO: 0.04 K/UL
BASOPHILS NFR BLD AUTO: 0.5 %
BILIRUB SERPL-MCNC: 1.1 MG/DL
BUN SERPL-MCNC: 11 MG/DL
CALCIUM SERPL-MCNC: 10.2 MG/DL
CHLORIDE SERPL-SCNC: 103 MMOL/L
CHOLEST SERPL-MCNC: 161 MG/DL
CO2 SERPL-SCNC: 25 MMOL/L
CREAT SERPL-MCNC: 0.97 MG/DL
EGFR: 102 ML/MIN/1.73M2
EOSINOPHIL # BLD AUTO: 0.1 K/UL
EOSINOPHIL NFR BLD AUTO: 1.3 %
ESTIMATED AVERAGE GLUCOSE: 126 MG/DL
FERRITIN SERPL-MCNC: 114 NG/ML
FOLATE SERPL-MCNC: 15.6 NG/ML
GLUCOSE SERPL-MCNC: 80 MG/DL
HBA1C MFR BLD HPLC: 6 %
HCT VFR BLD CALC: 43.9 %
HDLC SERPL-MCNC: 41 MG/DL
HGB BLD-MCNC: 13.7 G/DL
IMM GRANULOCYTES NFR BLD AUTO: 0.1 %
IRON SATN MFR SERPL: 22 %
IRON SERPL-MCNC: 91 UG/DL
LDLC SERPL CALC-MCNC: 103 MG/DL
LYMPHOCYTES # BLD AUTO: 3.4 K/UL
LYMPHOCYTES NFR BLD AUTO: 45.8 %
MAN DIFF?: NORMAL
MCHC RBC-ENTMCNC: 21.7 PG
MCHC RBC-ENTMCNC: 31.2 G/DL
MCV RBC AUTO: 69.7 FL
MONOCYTES # BLD AUTO: 0.42 K/UL
MONOCYTES NFR BLD AUTO: 5.7 %
NEUTROPHILS # BLD AUTO: 3.46 K/UL
NEUTROPHILS NFR BLD AUTO: 46.6 %
NONHDLC SERPL-MCNC: 120 MG/DL
PLATELET # BLD AUTO: 186 K/UL
POTASSIUM SERPL-SCNC: 4.5 MMOL/L
PROT SERPL-MCNC: 8.1 G/DL
PSA SERPL-MCNC: 0.41 NG/ML
RAPID RVP RESULT: NOT DETECTED
RBC # BLD: 6.3 M/UL
RBC # FLD: 17.6 %
SARS-COV-2 RNA RESP QL NAA+PROBE: NOT DETECTED
SODIUM SERPL-SCNC: 143 MMOL/L
TIBC SERPL-MCNC: 406 UG/DL
TRIGL SERPL-MCNC: 94 MG/DL
TROPONIN-T, HIGH SENSITIVITY: 32 NG/L
UIBC SERPL-MCNC: 315 UG/DL
VIT B12 SERPL-MCNC: 915 PG/ML
WBC # FLD AUTO: 7.43 K/UL

## 2025-04-02 ENCOUNTER — APPOINTMENT (OUTPATIENT)
Facility: CLINIC | Age: 40
End: 2025-04-02
Payer: COMMERCIAL

## 2025-04-02 VITALS
HEART RATE: 79 BPM | DIASTOLIC BLOOD PRESSURE: 78 MMHG | WEIGHT: 148 LBS | BODY MASS INDEX: 22.43 KG/M2 | HEIGHT: 68 IN | SYSTOLIC BLOOD PRESSURE: 120 MMHG | OXYGEN SATURATION: 97 %

## 2025-04-02 DIAGNOSIS — K62.5 HEMORRHAGE OF ANUS AND RECTUM: ICD-10-CM

## 2025-04-02 PROCEDURE — 99204 OFFICE O/P NEW MOD 45 MIN: CPT

## 2025-04-02 RX ORDER — SODIUM SULFATE, POTASSIUM SULFATE AND MAGNESIUM SULFATE 1.6; 3.13; 17.5 G/177ML; G/177ML; G/177ML
17.5-3.13-1.6 SOLUTION ORAL
Qty: 1 | Refills: 0 | Status: ACTIVE | COMMUNITY
Start: 2025-04-02 | End: 1900-01-01

## 2025-04-09 ENCOUNTER — APPOINTMENT (OUTPATIENT)
Facility: CLINIC | Age: 40
End: 2025-04-09

## (undated) DEVICE — SUT VICRYL 2-0 27" SH UNDYED

## (undated) DEVICE — YELLOW PIN COVER

## (undated) DEVICE — SOL IRR POUR NS 0.9% 500ML

## (undated) DEVICE — DRAPE 3/4 SHEET 52X76"

## (undated) DEVICE — BUR STRYKER CARBIDE CROSS CUT FISSURE 1MM

## (undated) DEVICE — GLV 8 PROTEXIS (CREAM) MICRO

## (undated) DEVICE — Device

## (undated) DEVICE — FOLEY TRAY 14FR 5CC LF UMETER CLOSED

## (undated) DEVICE — ELCTR GROUNDING PAD ADULT COVIDIEN

## (undated) DEVICE — DRAPE MAYO STAND 23"

## (undated) DEVICE — BRA JAW

## (undated) DEVICE — DRILL BIT STRYKER CRANIOMAXILLOFACIAL TWIST 1.35X50MM

## (undated) DEVICE — BUR STRYKER CROSS CUT FISSURE CARBIDE 1.6X44.5MM

## (undated) DEVICE — SYR LUER LOK 20CC

## (undated) DEVICE — SUT CHROMIC 4-0 27" RB-1

## (undated) DEVICE — POSITIONER FOAM EGG CRATE ULNAR 2PCS (PINK)

## (undated) DEVICE — DRAIN PENROSE .25" X 18" LATEX

## (undated) DEVICE — DRILL BIT STRYKER CRANIOMAXILLOFACIAL 1.5X20MM

## (undated) DEVICE — DRAPE TOWEL BLUE 17" X 24"

## (undated) DEVICE — PREP BETADINE KIT

## (undated) DEVICE — SUT CHROMIC 3-0 27" RB-1

## (undated) DEVICE — SYR LUER LOK 10CC

## (undated) DEVICE — TUBING IRRIGATION PIEZO

## (undated) DEVICE — ELCTR BOVIE TIP BLADE INSULATED 2.75" EDGE

## (undated) DEVICE — STAPLER SKIN VISI-STAT 35 WIDE

## (undated) DEVICE — VENODYNE/SCD SLEEVE CALF MEDIUM

## (undated) DEVICE — ELCTR COLORADO 3CM

## (undated) DEVICE — DRILL BIT STRYKER CRANIOMAXILLOFACIAL 1.5MM

## (undated) DEVICE — WARMING BLANKET FULL ADULT

## (undated) DEVICE — BIPOLAR FORCEP CORD WECK STANDARD 12FT

## (undated) DEVICE — RASP STRYKER LARGE TEAR CROSSCUT 14X7MM DISP

## (undated) DEVICE — SAW BLADE STRYKER RECIPROCATING 22.5MMX0.38MM

## (undated) DEVICE — NDL HYPO REGULAR BEVEL 25G X 1.5" (BLUE)

## (undated) DEVICE — LABELS BLANK W PEN

## (undated) DEVICE — PACK DENTAL